# Patient Record
Sex: MALE | Race: WHITE | NOT HISPANIC OR LATINO | Employment: PART TIME | URBAN - METROPOLITAN AREA
[De-identification: names, ages, dates, MRNs, and addresses within clinical notes are randomized per-mention and may not be internally consistent; named-entity substitution may affect disease eponyms.]

---

## 2023-03-24 PROBLEM — R50.9 FEVER: Status: ACTIVE | Noted: 2023-03-24

## 2023-03-24 PROBLEM — M75.20 BICEPS TENDINITIS: Status: ACTIVE | Noted: 2023-03-24

## 2023-03-24 PROBLEM — M79.645 PAIN OF LEFT THUMB: Status: ACTIVE | Noted: 2023-03-24

## 2023-03-24 PROBLEM — E66.3 OVERWEIGHT (BMI 25.0-29.9): Status: ACTIVE | Noted: 2023-03-24

## 2023-03-24 PROBLEM — I10 HYPERTENSION: Status: ACTIVE | Noted: 2023-03-24

## 2023-03-24 PROBLEM — M62.830 BACK MUSCLE SPASM: Status: ACTIVE | Noted: 2023-03-24

## 2023-03-24 PROBLEM — M19.049 ARTHRITIS OF HAND: Status: ACTIVE | Noted: 2023-03-24

## 2023-03-24 PROBLEM — G47.00 INSOMNIA: Status: ACTIVE | Noted: 2023-03-24

## 2023-03-24 PROBLEM — U07.1 COVID-19: Status: ACTIVE | Noted: 2023-03-24

## 2023-03-24 PROBLEM — E06.1 DE QUERVAIN THYROIDITIS: Status: ACTIVE | Noted: 2023-03-24
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
## 2023-03-24 PROBLEM — D64.9 ANEMIA: Status: ACTIVE | Noted: 2023-03-24

## 2023-03-24 PROBLEM — M54.50 LOW BACK PAIN: Status: ACTIVE | Noted: 2023-03-24

## 2023-03-24 PROBLEM — L30.9 ECZEMA: Status: ACTIVE | Noted: 2023-03-24

## 2023-03-24 PROBLEM — D72.810 EPISODIC LYMPHOPENIA: Status: ACTIVE | Noted: 2023-03-24

## 2023-03-24 PROBLEM — F10.10 ALCOHOL ABUSE: Status: ACTIVE | Noted: 2023-03-24

## 2023-03-24 RX ORDER — DICLOFENAC SODIUM 10 MG/G
GEL TOPICAL
COMMUNITY
Start: 2022-02-18 | End: 2023-03-27 | Stop reason: ALTCHOICE

## 2023-03-24 RX ORDER — LISINOPRIL 10 MG/1
1 TABLET ORAL DAILY
COMMUNITY
Start: 2022-02-18 | End: 2023-05-30 | Stop reason: SDUPTHER

## 2023-03-24 RX ORDER — TRAZODONE HYDROCHLORIDE 50 MG/1
0.5 TABLET ORAL NIGHTLY
COMMUNITY
Start: 2022-08-31 | End: 2023-05-24

## 2023-03-24 RX ORDER — CYCLOBENZAPRINE HCL 5 MG
1 TABLET ORAL 3 TIMES DAILY PRN
COMMUNITY
Start: 2022-06-16 | End: 2023-03-27 | Stop reason: ALTCHOICE

## 2023-03-24 RX ORDER — LOPERAMIDE HYDROCHLORIDE 2 MG/1
2 CAPSULE ORAL
COMMUNITY
End: 2024-03-08 | Stop reason: WASHOUT

## 2023-03-24 RX ORDER — HYDROCORTISONE 25 MG/G
CREAM TOPICAL 2 TIMES DAILY
COMMUNITY
Start: 2021-08-17

## 2023-03-24 RX ORDER — AMLODIPINE BESYLATE 5 MG/1
1 TABLET ORAL DAILY
COMMUNITY
Start: 2021-08-17 | End: 2023-03-27 | Stop reason: SDUPTHER

## 2023-03-24 RX ORDER — ONDANSETRON 4 MG/1
4 TABLET, FILM COATED ORAL EVERY 8 HOURS
COMMUNITY
End: 2023-03-27 | Stop reason: ALTCHOICE

## 2023-03-24 RX ORDER — NAPROXEN 250 MG/1
1 TABLET ORAL EVERY 12 HOURS PRN
COMMUNITY
Start: 2022-05-19 | End: 2023-03-27 | Stop reason: ALTCHOICE

## 2023-03-24 RX ORDER — MELOXICAM 15 MG/1
1 TABLET ORAL DAILY PRN
COMMUNITY
Start: 2021-08-24 | End: 2023-03-27 | Stop reason: ALTCHOICE

## 2023-03-24 RX ORDER — CLOBETASOL PROPIONATE 0.5 MG/G
1 CREAM TOPICAL 2 TIMES DAILY
COMMUNITY
Start: 2022-05-16

## 2023-03-24 RX ORDER — ALBUTEROL SULFATE 90 UG/1
1-2 AEROSOL, METERED RESPIRATORY (INHALATION)
COMMUNITY
End: 2023-03-27 | Stop reason: ALTCHOICE

## 2023-03-27 ENCOUNTER — OFFICE VISIT (OUTPATIENT)
Dept: PRIMARY CARE | Facility: CLINIC | Age: 53
End: 2023-03-27
Payer: COMMERCIAL

## 2023-03-27 VITALS
BODY MASS INDEX: 29.4 KG/M2 | WEIGHT: 210 LBS | HEIGHT: 71 IN | SYSTOLIC BLOOD PRESSURE: 126 MMHG | DIASTOLIC BLOOD PRESSURE: 82 MMHG

## 2023-03-27 DIAGNOSIS — R19.7 DIARRHEA, UNSPECIFIED TYPE: ICD-10-CM

## 2023-03-27 DIAGNOSIS — I10 PRIMARY HYPERTENSION: Primary | ICD-10-CM

## 2023-03-27 PROCEDURE — 3074F SYST BP LT 130 MM HG: CPT | Performed by: STUDENT IN AN ORGANIZED HEALTH CARE EDUCATION/TRAINING PROGRAM

## 2023-03-27 PROCEDURE — 99213 OFFICE O/P EST LOW 20 MIN: CPT | Performed by: STUDENT IN AN ORGANIZED HEALTH CARE EDUCATION/TRAINING PROGRAM

## 2023-03-27 PROCEDURE — 3079F DIAST BP 80-89 MM HG: CPT | Performed by: STUDENT IN AN ORGANIZED HEALTH CARE EDUCATION/TRAINING PROGRAM

## 2023-03-27 PROCEDURE — 1036F TOBACCO NON-USER: CPT | Performed by: STUDENT IN AN ORGANIZED HEALTH CARE EDUCATION/TRAINING PROGRAM

## 2023-03-27 RX ORDER — AMLODIPINE BESYLATE 5 MG/1
5 TABLET ORAL DAILY
Qty: 90 TABLET | Refills: 1 | Status: SHIPPED | OUTPATIENT
Start: 2023-03-27 | End: 2023-09-25

## 2023-03-27 RX ORDER — LORATADINE 10 MG/1
10 TABLET ORAL
COMMUNITY
Start: 2015-01-22 | End: 2023-03-27 | Stop reason: ALTCHOICE

## 2023-03-27 RX ORDER — METHOCARBAMOL 500 MG/1
TABLET, FILM COATED ORAL
COMMUNITY
Start: 2023-01-24 | End: 2023-03-27 | Stop reason: ALTCHOICE

## 2023-03-27 RX ORDER — MELOXICAM 15 MG/1
15 TABLET ORAL
COMMUNITY
Start: 2021-08-24 | End: 2023-03-27 | Stop reason: ALTCHOICE

## 2023-03-27 RX ORDER — HYDROCORTISONE 25 MG/G
CREAM TOPICAL 2 TIMES DAILY
COMMUNITY
Start: 2021-08-17 | End: 2023-03-27 | Stop reason: ALTCHOICE

## 2023-03-27 RX ORDER — TRIAMCINOLONE ACETONIDE 1 MG/G
1 CREAM TOPICAL
COMMUNITY
Start: 2015-01-26 | End: 2023-03-27 | Stop reason: ALTCHOICE

## 2023-03-27 RX ORDER — DICLOFENAC SODIUM 75 MG/1
75 TABLET, DELAYED RELEASE ORAL 2 TIMES DAILY
COMMUNITY
Start: 2021-08-31 | End: 2023-03-27 | Stop reason: ALTCHOICE

## 2023-03-27 ASSESSMENT — ENCOUNTER SYMPTOMS
DIARRHEA: 1
MUSCULOSKELETAL NEGATIVE: 1
RESPIRATORY NEGATIVE: 1
NEUROLOGICAL NEGATIVE: 1
PSYCHIATRIC NEGATIVE: 1
CARDIOVASCULAR NEGATIVE: 1
ABDOMINAL PAIN: 1
CONSTITUTIONAL NEGATIVE: 1
NAUSEA: 1

## 2023-03-27 NOTE — LETTER
March 27, 2023     Patient: Adalberto Franklin Jr.   YOB: 1970   Date of Visit: 3/27/2023       To Whom It May Concern:    Adalberto Franklin was seen in my clinic on 3/27/2023 at 11:00 am. Please excuse Adalberto for his absence from work on 3/21/23-3/25/23. Patient is cleared to return to work on 3/28/23 with no restrictions    If you have any questions or concerns, please don't hesitate to call.         Sincerely,         Tom Rincon,         CC: No Recipients
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
## 2023-04-04 ENCOUNTER — OFFICE VISIT (OUTPATIENT)
Dept: PRIMARY CARE | Facility: CLINIC | Age: 53
End: 2023-04-04
Payer: COMMERCIAL

## 2023-04-04 ENCOUNTER — LAB (OUTPATIENT)
Dept: LAB | Facility: LAB | Age: 53
End: 2023-04-04
Payer: COMMERCIAL

## 2023-04-04 VITALS
OXYGEN SATURATION: 98 % | BODY MASS INDEX: 30.52 KG/M2 | WEIGHT: 218 LBS | HEIGHT: 71 IN | SYSTOLIC BLOOD PRESSURE: 136 MMHG | DIASTOLIC BLOOD PRESSURE: 80 MMHG | HEART RATE: 83 BPM

## 2023-04-04 DIAGNOSIS — R19.7 DIARRHEA, UNSPECIFIED TYPE: ICD-10-CM

## 2023-04-04 DIAGNOSIS — Z00.00 HEALTHCARE MAINTENANCE: Primary | ICD-10-CM

## 2023-04-04 DIAGNOSIS — K52.9 GASTROENTERITIS: ICD-10-CM

## 2023-04-04 DIAGNOSIS — Z00.00 HEALTHCARE MAINTENANCE: ICD-10-CM

## 2023-04-04 LAB
BASOPHILS (10*3/UL) IN BLOOD BY AUTOMATED COUNT: 0.03 X10E9/L (ref 0–0.1)
BASOPHILS/100 LEUKOCYTES IN BLOOD BY AUTOMATED COUNT: 0.7 % (ref 0–2)
EOSINOPHILS (10*3/UL) IN BLOOD BY AUTOMATED COUNT: 0.05 X10E9/L (ref 0–0.7)
EOSINOPHILS/100 LEUKOCYTES IN BLOOD BY AUTOMATED COUNT: 1.1 % (ref 0–6)
ERYTHROCYTE DISTRIBUTION WIDTH (RATIO) BY AUTOMATED COUNT: 15.9 % (ref 11.5–14.5)
ERYTHROCYTE MEAN CORPUSCULAR HEMOGLOBIN CONCENTRATION (G/DL) BY AUTOMATED: 31.5 G/DL (ref 32–36)
ERYTHROCYTE MEAN CORPUSCULAR VOLUME (FL) BY AUTOMATED COUNT: 90 FL (ref 80–100)
ERYTHROCYTES (10*6/UL) IN BLOOD BY AUTOMATED COUNT: 4.82 X10E12/L (ref 4.5–5.9)
HEMATOCRIT (%) IN BLOOD BY AUTOMATED COUNT: 43.5 % (ref 41–52)
HEMOGLOBIN (G/DL) IN BLOOD: 13.7 G/DL (ref 13.5–17.5)
IMMATURE GRANULOCYTES/100 LEUKOCYTES IN BLOOD BY AUTOMATED COUNT: 0.2 % (ref 0–0.9)
LEUKOCYTES (10*3/UL) IN BLOOD BY AUTOMATED COUNT: 4.4 X10E9/L (ref 4.4–11.3)
LYMPHOCYTES (10*3/UL) IN BLOOD BY AUTOMATED COUNT: 0.99 X10E9/L (ref 1.2–4.8)
LYMPHOCYTES/100 LEUKOCYTES IN BLOOD BY AUTOMATED COUNT: 22.6 % (ref 13–44)
MONOCYTES (10*3/UL) IN BLOOD BY AUTOMATED COUNT: 0.6 X10E9/L (ref 0.1–1)
MONOCYTES/100 LEUKOCYTES IN BLOOD BY AUTOMATED COUNT: 13.7 % (ref 2–10)
NEUTROPHILS (10*3/UL) IN BLOOD BY AUTOMATED COUNT: 2.7 X10E9/L (ref 1.2–7.7)
NEUTROPHILS/100 LEUKOCYTES IN BLOOD BY AUTOMATED COUNT: 61.7 % (ref 40–80)
NRBC (PER 100 WBCS) BY AUTOMATED COUNT: 0 /100 WBC (ref 0–0)
PLATELETS (10*3/UL) IN BLOOD AUTOMATED COUNT: 215 X10E9/L (ref 150–450)
PROSTATE SPECIFIC ANTIGEN,SCREEN: 0.65 NG/ML (ref 0–4)
THYROTROPIN (MIU/L) IN SER/PLAS BY DETECTION LIMIT <= 0.05 MIU/L: 1.01 MIU/L (ref 0.44–3.98)
THYROXINE (T4) FREE (NG/DL) IN SER/PLAS: 1.12 NG/DL (ref 0.78–1.48)

## 2023-04-04 PROCEDURE — 3075F SYST BP GE 130 - 139MM HG: CPT | Performed by: INTERNAL MEDICINE

## 2023-04-04 PROCEDURE — 80053 COMPREHEN METABOLIC PANEL: CPT

## 2023-04-04 PROCEDURE — 85025 COMPLETE CBC W/AUTO DIFF WBC: CPT

## 2023-04-04 PROCEDURE — 84153 ASSAY OF PSA TOTAL: CPT

## 2023-04-04 PROCEDURE — 84439 ASSAY OF FREE THYROXINE: CPT

## 2023-04-04 PROCEDURE — 99214 OFFICE O/P EST MOD 30 MIN: CPT | Performed by: INTERNAL MEDICINE

## 2023-04-04 PROCEDURE — 36415 COLL VENOUS BLD VENIPUNCTURE: CPT

## 2023-04-04 PROCEDURE — 1036F TOBACCO NON-USER: CPT | Performed by: INTERNAL MEDICINE

## 2023-04-04 PROCEDURE — 83036 HEMOGLOBIN GLYCOSYLATED A1C: CPT

## 2023-04-04 PROCEDURE — 3079F DIAST BP 80-89 MM HG: CPT | Performed by: INTERNAL MEDICINE

## 2023-04-04 PROCEDURE — 80061 LIPID PANEL: CPT

## 2023-04-04 PROCEDURE — 83690 ASSAY OF LIPASE: CPT

## 2023-04-04 PROCEDURE — 84443 ASSAY THYROID STIM HORMONE: CPT

## 2023-04-04 RX ORDER — LEVOFLOXACIN 500 MG/1
500 TABLET, FILM COATED ORAL DAILY
Qty: 7 TABLET | Refills: 2 | Status: SHIPPED | OUTPATIENT
Start: 2023-04-04 | End: 2023-04-11

## 2023-04-04 RX ORDER — METRONIDAZOLE 500 MG/1
500 TABLET ORAL 3 TIMES DAILY
Qty: 21 TABLET | Refills: 2 | Status: SHIPPED | OUTPATIENT
Start: 2023-04-04 | End: 2023-04-11

## 2023-04-04 NOTE — PROGRESS NOTES
"Subjective   Patient ID: Adalberto Franklin Jr. is a 52 y.o. male who presents for Follow-up (1 week follow up on stomach ache. Still having issues, diarrhea and pains in stomach.).  HPI        Patient presents he states since last Monday he is having diarrhea seem to get better maybe for Tuesday intermittently got worse on Sunday came back again rather severe diarrhea \" smells like death\" somewhere over 4 stools a day grade 7 out of 10 seems worse with eating anything he eats causes diarrhea nothing really makes better except not eating tried Imodium Pepto-Bismol [place] did not really help  Denies abdominal pain nausea vomiting fever chills hematochezia melena hematemesis  Denies recent travel sick contacts raw food recent antibiotics        Health Maintenance:      Colonoscopy: Refused      Mammogram:      Pelvic/Pap:      Low dose chest CT:      Aorta duplex:      Optho:      Podiatry:        Vaccines:      Prevnar 20:      Prevnar 13:      Pneumovax 23:      Tdap:      Shingrix:      COVID:      Influenza:        ROS:      General: denies fever/chills/weight loss      Head: Sinus congestion for 2 months persistent denies HA/trauma/masses/dizziness      Eyes: denies vision change/loss of vision/blurry vision/diplopia/eye pain      Ears: denies hearing loss/tinnitus/otalgia/otorrhea      Nose: Occasional yellow rhinorrhea 2 months denies nasal drainage/anosmia      Throat: denies dysphagia/odynophagia      Lymphatics: denies lymph node swelling      Cardiac: denies CP/palpitations/orthopnea/PND      Pulmonary: denies dyspnea/cough/wheezing      GI: Subacute diarrhea worsening not getting better denies abd pain/n/v/diarrhea/melena/hematochezia/hematemesis      : denies dysuria/hematuria/change frequency      Genital: denies genital discharge/lesions      Skin: denies rashes/lesions/masses      MSK: denies weakness/swelling/edema/gait imbalance/pain      Neuro: denies paresthesias/seizures/dysarthria      " "Psych: denies depression/anxiety/suicidal or homicidal ideations            Objective   /80   Pulse 83   Ht 1.803 m (5' 11\")   Wt 98.9 kg (218 lb)   SpO2 98%   BMI 30.40 kg/m²      Physical Exam:     General: AO3, NAD     Head: atraumatic/NC     Eyes: EOMI/PERRLA. Negative APD     Ears: TM pearly gray, EAC clear. No lesions or erythema     Nose: symmetric nares, no discharge     Throat: trachea midline, uvula midline pink mucosa. No thyromegaly     Lymphatics: no cervical/supraclavicular/ant or posterior cervical adenopathy/axillary/inguinal adenopathy     Breast: not examined     Chest: no deformity or tenderness to palpation     Pulm: CTA b/l, no wheeze/rhonchi/rales. nonlabored     Cardiac: RRR +s1s2, no m/r/g.      GI: soft, NT/ND. Normoactive Bsx4. No rebound/guarding.  Negative Melendez negative Rovsing negative McBurney     Rectal: no examined     MSK: 5/5 strength UE LE. No edema/clubbing/cyanosis     Skin: no rashes/lesions     Vascular: 2+ palp DP PT radials b/l. Negative carotid bruit     Neuro: CNII-XII intact. No focal deficits. Reflexes 2/4 brachioradialis bicep tricep patellar achilles. Finger to nose intact.     Psych: appropriate mood/affect                    No results found for: BMPR1A, CBCDIF      Assessment/Plan   Diagnoses and all orders for this visit:  Healthcare maintenance  -     Lipid panel; Future  -     Hemoglobin A1C; Future  -     T4, free; Future  -     TSH; Future  -     Prostate Spec.Ag,Screen; Future  Gastroenteritis  Comments:  Probable bacterial less likely persistent viral such as coronavirus versus other norovirus versus C. difficile  Orders:  -     Sars-CoV-2 PCR, Symptomatic; Future  -     Stool Pathogen Panel, PCR; Future  -     C. difficile, PCR; Future  -     levoFLOXacin (Levaquin) 500 mg tablet; Take 1 tablet (500 mg) by mouth once daily for 7 days.  -     metroNIDAZOLE (Flagyl) 500 mg tablet; Take 1 tablet (500 mg) by mouth in the morning and 1 tablet (500 mg) " in the evening and 1 tablet (500 mg) before bedtime. Do all this for 7 days.  -     Lipase; Future    Go to the ER for any new or worsening abdominal pain or diarrhea  Work note written for patient to go back to work April 7, 2023    Thank you for making from today Adalberto    Please follow-up in 3 months       Sierra Nguyen MA

## 2023-04-04 NOTE — LETTER
April 4, 2023     Patient: Adalberto Franklin .   YOB: 1970   Date of Visit: 4/4/2023       To Whom It May Concern:    Adalberto Franklin was seen in my clinic on 4/4/2023 at 2:45 pm. Please excuse Adalberto for his absence from work on this day to make the appointment, through 4/6/23, return to work 4/7/23.    If you have any questions or concerns, please don't hesitate to call.         Sincerely,         Michael Marc,         CC: No Recipients

## 2023-04-05 DIAGNOSIS — E78.5 HYPERLIPIDEMIA, UNSPECIFIED HYPERLIPIDEMIA TYPE: Primary | ICD-10-CM

## 2023-04-05 LAB
ALANINE AMINOTRANSFERASE (SGPT) (U/L) IN SER/PLAS: 32 U/L (ref 10–52)
ALBUMIN (G/DL) IN SER/PLAS: 4.7 G/DL (ref 3.4–5)
ALKALINE PHOSPHATASE (U/L) IN SER/PLAS: 48 U/L (ref 33–120)
ANION GAP IN SER/PLAS: 20 MMOL/L (ref 10–20)
ASPARTATE AMINOTRANSFERASE (SGOT) (U/L) IN SER/PLAS: 37 U/L (ref 9–39)
BILIRUBIN TOTAL (MG/DL) IN SER/PLAS: 0.7 MG/DL (ref 0–1.2)
CALCIUM (MG/DL) IN SER/PLAS: 9.7 MG/DL (ref 8.6–10.6)
CARBON DIOXIDE, TOTAL (MMOL/L) IN SER/PLAS: 23 MMOL/L (ref 21–32)
CHLORIDE (MMOL/L) IN SER/PLAS: 103 MMOL/L (ref 98–107)
CHOLESTEROL (MG/DL) IN SER/PLAS: 223 MG/DL (ref 0–199)
CHOLESTEROL IN HDL (MG/DL) IN SER/PLAS: 89.1 MG/DL
CHOLESTEROL/HDL RATIO: 2.5
CREATININE (MG/DL) IN SER/PLAS: 0.87 MG/DL (ref 0.5–1.3)
ESTIMATED AVERAGE GLUCOSE FOR HBA1C: 126 MG/DL
GFR MALE: >90 ML/MIN/1.73M2
GLUCOSE (MG/DL) IN SER/PLAS: 104 MG/DL (ref 74–99)
HEMOGLOBIN A1C/HEMOGLOBIN TOTAL IN BLOOD: 6 %
LDL: 124 MG/DL (ref 0–99)
LIPASE (U/L) IN SER/PLAS: 41 U/L (ref 9–82)
POTASSIUM (MMOL/L) IN SER/PLAS: 4.5 MMOL/L (ref 3.5–5.3)
PROTEIN TOTAL: 7.9 G/DL (ref 6.4–8.2)
SODIUM (MMOL/L) IN SER/PLAS: 141 MMOL/L (ref 136–145)
TRIGLYCERIDE (MG/DL) IN SER/PLAS: 49 MG/DL (ref 0–149)
UREA NITROGEN (MG/DL) IN SER/PLAS: 6 MG/DL (ref 6–23)
VLDL: 10 MG/DL (ref 0–40)

## 2023-06-30 ENCOUNTER — OFFICE VISIT (OUTPATIENT)
Dept: PRIMARY CARE | Facility: CLINIC | Age: 53
End: 2023-06-30
Payer: COMMERCIAL

## 2023-06-30 VITALS — SYSTOLIC BLOOD PRESSURE: 120 MMHG | DIASTOLIC BLOOD PRESSURE: 76 MMHG

## 2023-06-30 DIAGNOSIS — M19.049 ARTHRITIS OF HAND: Primary | ICD-10-CM

## 2023-06-30 DIAGNOSIS — I10 PRIMARY HYPERTENSION: ICD-10-CM

## 2023-06-30 PROCEDURE — 3078F DIAST BP <80 MM HG: CPT | Performed by: STUDENT IN AN ORGANIZED HEALTH CARE EDUCATION/TRAINING PROGRAM

## 2023-06-30 PROCEDURE — 99213 OFFICE O/P EST LOW 20 MIN: CPT | Performed by: STUDENT IN AN ORGANIZED HEALTH CARE EDUCATION/TRAINING PROGRAM

## 2023-06-30 PROCEDURE — 3074F SYST BP LT 130 MM HG: CPT | Performed by: STUDENT IN AN ORGANIZED HEALTH CARE EDUCATION/TRAINING PROGRAM

## 2023-06-30 PROCEDURE — 1036F TOBACCO NON-USER: CPT | Performed by: STUDENT IN AN ORGANIZED HEALTH CARE EDUCATION/TRAINING PROGRAM

## 2023-06-30 RX ORDER — DICLOFENAC SODIUM 10 MG/G
4 GEL TOPICAL 3 TIMES DAILY PRN
Qty: 450 G | Refills: 1 | Status: SHIPPED | OUTPATIENT
Start: 2023-06-30 | End: 2023-09-28

## 2023-06-30 ASSESSMENT — ENCOUNTER SYMPTOMS
NEUROLOGICAL NEGATIVE: 1
CONSTITUTIONAL NEGATIVE: 1
MUSCULOSKELETAL NEGATIVE: 1
RESPIRATORY NEGATIVE: 1
PSYCHIATRIC NEGATIVE: 1
CARDIOVASCULAR NEGATIVE: 1
GASTROINTESTINAL NEGATIVE: 1
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
## 2023-07-03 ENCOUNTER — APPOINTMENT (OUTPATIENT)
Dept: PRIMARY CARE | Facility: CLINIC | Age: 53
End: 2023-07-03
Payer: COMMERCIAL

## 2023-07-10 DIAGNOSIS — E78.5 HYPERLIPIDEMIA, UNSPECIFIED HYPERLIPIDEMIA TYPE: ICD-10-CM

## 2023-08-28 DIAGNOSIS — I10 PRIMARY HYPERTENSION: ICD-10-CM

## 2023-08-28 RX ORDER — LISINOPRIL 10 MG/1
10 TABLET ORAL DAILY
Qty: 90 TABLET | Refills: 1 | Status: SHIPPED | OUTPATIENT
Start: 2023-08-28 | End: 2023-11-02 | Stop reason: SDUPTHER

## 2023-09-23 DIAGNOSIS — I10 PRIMARY HYPERTENSION: ICD-10-CM

## 2023-09-25 RX ORDER — AMLODIPINE BESYLATE 5 MG/1
5 TABLET ORAL DAILY
Qty: 90 TABLET | Refills: 0 | Status: SHIPPED | OUTPATIENT
Start: 2023-09-25 | End: 2024-01-05 | Stop reason: SDUPTHER

## 2023-11-02 ENCOUNTER — OFFICE VISIT (OUTPATIENT)
Dept: PRIMARY CARE | Facility: CLINIC | Age: 53
End: 2023-11-02
Payer: COMMERCIAL

## 2023-11-02 VITALS — SYSTOLIC BLOOD PRESSURE: 168 MMHG | WEIGHT: 220 LBS | DIASTOLIC BLOOD PRESSURE: 80 MMHG | BODY MASS INDEX: 30.68 KG/M2

## 2023-11-02 DIAGNOSIS — I10 PRIMARY HYPERTENSION: ICD-10-CM

## 2023-11-02 DIAGNOSIS — M19.049 HAND ARTHRITIS: ICD-10-CM

## 2023-11-02 DIAGNOSIS — G44.209 TENSION HEADACHE: Primary | ICD-10-CM

## 2023-11-02 PROBLEM — R21 RASH AND OTHER NONSPECIFIC SKIN ERUPTION: Status: ACTIVE | Noted: 2022-08-02

## 2023-11-02 PROCEDURE — 99214 OFFICE O/P EST MOD 30 MIN: CPT | Performed by: STUDENT IN AN ORGANIZED HEALTH CARE EDUCATION/TRAINING PROGRAM

## 2023-11-02 PROCEDURE — 3079F DIAST BP 80-89 MM HG: CPT | Performed by: STUDENT IN AN ORGANIZED HEALTH CARE EDUCATION/TRAINING PROGRAM

## 2023-11-02 PROCEDURE — 1036F TOBACCO NON-USER: CPT | Performed by: STUDENT IN AN ORGANIZED HEALTH CARE EDUCATION/TRAINING PROGRAM

## 2023-11-02 PROCEDURE — 3077F SYST BP >= 140 MM HG: CPT | Performed by: STUDENT IN AN ORGANIZED HEALTH CARE EDUCATION/TRAINING PROGRAM

## 2023-11-02 RX ORDER — TRIAMCINOLONE ACETONIDE 1 MG/G
1 CREAM TOPICAL
COMMUNITY
Start: 2015-01-26

## 2023-11-02 RX ORDER — LISINOPRIL 20 MG/1
20 TABLET ORAL DAILY
Qty: 90 TABLET | Refills: 1 | Status: SHIPPED | OUTPATIENT
Start: 2023-11-02 | End: 2024-05-13 | Stop reason: SDUPTHER

## 2023-11-02 RX ORDER — SUMATRIPTAN SUCCINATE 100 MG/1
100 TABLET ORAL ONCE AS NEEDED
Qty: 27 TABLET | Refills: 1 | Status: SHIPPED | OUTPATIENT
Start: 2023-11-02 | End: 2024-03-08 | Stop reason: WASHOUT

## 2023-11-02 ASSESSMENT — ENCOUNTER SYMPTOMS
CONSTITUTIONAL NEGATIVE: 1
GASTROINTESTINAL NEGATIVE: 1
CARDIOVASCULAR NEGATIVE: 1
WEAKNESS: 1
MUSCULOSKELETAL NEGATIVE: 1
PSYCHIATRIC NEGATIVE: 1
HEADACHES: 1
RESPIRATORY NEGATIVE: 1

## 2023-11-02 NOTE — LETTER
November 2, 2023     Patient: Adalberto Franklin Jr.   YOB: 1970   Date of Visit: 11/2/2023       To Whom It May Concern:    Adalberto Franklin was seen in my clinic on 11/2/2023 at 2:30 pm. Please excuse Adalberto for his absence from work on this day as well as 11/3/23 and 11/4/23 due to medical illness.    If you have any questions or concerns, please don't hesitate to call.         Sincerely,         Tom Rincon,         CC: No Recipients

## 2023-11-02 NOTE — PROGRESS NOTES
Headaches for 3x days    Subjective   Patient ID: Adalberto Franklin Jr. is a 53 y.o. male.    Patient seen on follow-up and sick visit.  Regards that he is having worsening headaches over the past week or so.  States that these have come on by himself, He typically does not have migraines however these have been excruciating.  Has been trying to stay well-hydrated avoid excess triggering factors without much improvement.  Did take a medication from his girlfriend with some improvement as well  and this was a triptan medication.  However the migraines came back.  Regards that he is having hand pain as well, did have previous x-rays and would like to review those.  Otherwise, states no additional issues or concerns.    Headache   Associated symptoms include weakness.       Review of Systems   Constitutional: Negative.    HENT: Negative.     Respiratory: Negative.     Cardiovascular: Negative.    Gastrointestinal: Negative.    Musculoskeletal: Negative.    Skin: Negative.    Neurological:  Positive for weakness and headaches.   Psychiatric/Behavioral: Negative.         Objective   Visit Vitals  /80   Wt 99.8 kg (220 lb)   BMI 30.68 kg/m²   Smoking Status Never   BSA 2.24 m²    Physical Exam  Constitutional:       General: He is not in acute distress.     Appearance: He is not ill-appearing.   Eyes:      Pupils: Pupils are equal, round, and reactive to light.   Cardiovascular:      Rate and Rhythm: Normal rate and regular rhythm.      Pulses: Normal pulses.      Heart sounds: No murmur heard.  Pulmonary:      Effort: No respiratory distress.      Breath sounds: No wheezing.   Abdominal:      General: Abdomen is flat. Bowel sounds are normal. There is no distension.   Musculoskeletal:      Right lower leg: No edema.      Left lower leg: No edema.      Comments: Bilateral arthralgias CMC arthritis   Skin:     General: Skin is warm and dry.   Neurological:      Mental Status: He is alert. Mental status is at baseline.       Cranial Nerves: No cranial nerve deficit.      Motor: No weakness.   Psychiatric:         Mood and Affect: Mood normal.         Behavior: Behavior normal.         Assessment/Plan   Diagnoses and all orders for this visit:  Tension headache  -     rimegepant (NURTEC) 75 mg tablet,disintegrating; Take 1 tablet (75 mg) by mouth once daily as needed (headache).  -     SUMAtriptan (Imitrex) 100 mg tablet; Take 1 tablet (100 mg) by mouth 1 time if needed for migraine. May repeat after 2 hours.  Primary hypertension  -     lisinopril 20 mg tablet; Take 1 tablet (20 mg) by mouth once daily.  Hand arthritis  -     Referral to Orthopaedic Surgery; Future  Patient seen on sick evaluation    #Tension headaches  Signs and symptoms consistent with tension migraines, recommend Nurtec as needed as he is getting these very infrequently if not covered by insurance, recommend sumatriptan, avoidance of exacerbating factors, staying well-hydrated.  Continue supportive care, if persistent, recommend MRI and preventative medication    #Hypertension  Significantly elevated today could be a component of symptoms, increase lisinopril to 20 mg    #Bilateral hand arthritis  X-rays reviewed, discussed bracing, anti-inflammatories, referral to orthopedics    Return to care in 3 to 4 months or as needed

## 2023-11-03 ENCOUNTER — TELEPHONE (OUTPATIENT)
Dept: PRIMARY CARE | Facility: CLINIC | Age: 53
End: 2023-11-03
Payer: COMMERCIAL

## 2023-12-04 ENCOUNTER — OFFICE VISIT (OUTPATIENT)
Dept: ORTHOPEDIC SURGERY | Facility: CLINIC | Age: 53
End: 2023-12-04
Payer: COMMERCIAL

## 2023-12-04 VITALS — HEIGHT: 71 IN | BODY MASS INDEX: 30.1 KG/M2 | WEIGHT: 215 LBS

## 2023-12-04 DIAGNOSIS — M79.644 BILATERAL THUMB PAIN: Primary | ICD-10-CM

## 2023-12-04 DIAGNOSIS — M19.049 HAND ARTHRITIS: ICD-10-CM

## 2023-12-04 DIAGNOSIS — M79.645 BILATERAL THUMB PAIN: Primary | ICD-10-CM

## 2023-12-04 DIAGNOSIS — M18.0 PRIMARY OSTEOARTHRITIS OF BOTH FIRST CARPOMETACARPAL JOINTS: ICD-10-CM

## 2023-12-04 PROCEDURE — 1036F TOBACCO NON-USER: CPT | Performed by: ORTHOPAEDIC SURGERY

## 2023-12-04 PROCEDURE — 20600 DRAIN/INJ JOINT/BURSA W/O US: CPT | Performed by: ORTHOPAEDIC SURGERY

## 2023-12-04 PROCEDURE — 99213 OFFICE O/P EST LOW 20 MIN: CPT | Performed by: ORTHOPAEDIC SURGERY

## 2023-12-04 RX ORDER — LIDOCAINE HYDROCHLORIDE 20 MG/ML
0.5 INJECTION, SOLUTION INFILTRATION; PERINEURAL
Status: COMPLETED | OUTPATIENT
Start: 2023-12-04 | End: 2023-12-04

## 2023-12-04 RX ORDER — TRIAMCINOLONE ACETONIDE 40 MG/ML
5 INJECTION, SUSPENSION INTRA-ARTICULAR; INTRAMUSCULAR
Status: COMPLETED | OUTPATIENT
Start: 2023-12-04 | End: 2023-12-04

## 2023-12-04 RX ADMIN — LIDOCAINE HYDROCHLORIDE 0.5 ML: 20 INJECTION, SOLUTION INFILTRATION; PERINEURAL at 12:00

## 2023-12-04 RX ADMIN — TRIAMCINOLONE ACETONIDE 5 MG: 40 INJECTION, SUSPENSION INTRA-ARTICULAR; INTRAMUSCULAR at 12:00

## 2023-12-04 NOTE — PROGRESS NOTES
Subjective    Patient ID: Adalberto Diggs Jr. is a 53 y.o. male.    Chief Complaint: Pain of the Right Wrist and Pain of the Left Wrist (LEFT THUMB )     Last Surgery: No surgery found  Last Surgery Date: No surgery found    Right Wrist     Left Wrist       Patient is a 53-year-old right-hand-dominant gentleman who comes in with a complaint of bilateral thumb pain.  This has been present for at least 2 years.  He has tried anti-inflammatories with very little relief.  Symptoms are equally bad in both wrists.  He has difficulty with lifting objects and performing simple activities of daily living.    Objective   Ortho Exam  Patient is in no acute distress.specific exam of his left hand and wrist reveals his skin envelope is intact.  He is tender to palpation at the left or CMC joint.  He has a positive first CMC grind.  He has a negative Finkelstein test.  there is no evidence of a trigger thumb.    Exam of the patient's right hand and wrist reveals his skin envelope is intact.  There is no evidence of atrophy.  He is tender at the first CMC joint.  He has a positive for CMC grind.  He has a negative Finkelstein test.    Image Results:  XR wrist right 3+ views  Narrative: Interpreted By:  ALEKSANDR WALDROP MD  MRN: [id number]  Patient Name: ADALBERTO DIGGS     STUDY:  BILATERAL WRIST COMPLT; MIN 3 VIEWS;  7/11/2023 12:20 pm     INDICATION:  wrist pain bilaterally.     COMPARISON:  None.     ACCESSION NUMBER(S):  [id number]     ORDERING CLINICIAN:  IRINA CRAIG     FINDINGS:  Bilateral wrists, three views of each     There is severe joint space narrowing osteophytosis and sclerosis in  the 1st CMC and triscaphe joints on the left with moderate changes on  the right to include the midcarpal and triscaphe joints.. No erosions  or chondrocalcinosis seen. No soft tissue abnormality     Impression: Severe degenerative change on the left at the base of the thumb.  Moderate osteoarthritis in the midcarpal and triscaphe  joints on the  right. No erosions or chondrocalcinosis      Assessment/Plan   Encounter Diagnoses:  Bilateral thumb pain    Hand arthritis    Primary osteoarthritis of both first carpometacarpal joints    Patient has bilateral thumb pain secondary to first CMC arthritis.  We discussed treatment options and he elected to undergo bilateral first CMC Kenalog injections today.    S Inj/Asp: bilateral thumb CMC on 12/4/2023 12:00 PM  Indications: joint swelling  Details: 25 G needle, radial approach  Medications (Right): 5 mg triamcinolone acetonide 40 mg/mL; 0.5 mL lidocaine 20 mg/mL (2 %)  Medications (Left): 5 mg triamcinolone acetonide 40 mg/mL; 0.5 mL lidocaine 20 mg/mL (2 %)  Outcome: tolerated well, no immediate complications  Procedure, treatment alternatives, risks and benefits explained, specific risks discussed. Consent was given by the patient. Immediately prior to procedure a time out was called to verify the correct patient, procedure, equipment, support staff and site/side marked as required. Patient was prepped and draped in the usual sterile fashion.       Patient tolerated the injections well without any complications.  He was informed may take about a week to have an effect.  He may use both hands is much as his follow-up.  He will follow-up as his symptoms dictate.

## 2023-12-29 ENCOUNTER — APPOINTMENT (OUTPATIENT)
Dept: PRIMARY CARE | Facility: CLINIC | Age: 53
End: 2023-12-29
Payer: COMMERCIAL

## 2024-01-05 DIAGNOSIS — I10 PRIMARY HYPERTENSION: ICD-10-CM

## 2024-01-05 RX ORDER — AMLODIPINE BESYLATE 5 MG/1
5 TABLET ORAL DAILY
Qty: 90 TABLET | Refills: 0 | Status: SHIPPED | OUTPATIENT
Start: 2024-01-05 | End: 2024-04-01 | Stop reason: SDUPTHER

## 2024-01-12 ENCOUNTER — OFFICE VISIT (OUTPATIENT)
Dept: PRIMARY CARE | Facility: CLINIC | Age: 54
End: 2024-01-12
Payer: COMMERCIAL

## 2024-01-12 VITALS
WEIGHT: 218 LBS | BODY MASS INDEX: 30.52 KG/M2 | DIASTOLIC BLOOD PRESSURE: 84 MMHG | HEIGHT: 71 IN | SYSTOLIC BLOOD PRESSURE: 138 MMHG

## 2024-01-12 DIAGNOSIS — G47.00 INSOMNIA, UNSPECIFIED TYPE: ICD-10-CM

## 2024-01-12 DIAGNOSIS — M19.049 ARTHRITIS OF HAND: Primary | ICD-10-CM

## 2024-01-12 PROCEDURE — 3079F DIAST BP 80-89 MM HG: CPT | Performed by: STUDENT IN AN ORGANIZED HEALTH CARE EDUCATION/TRAINING PROGRAM

## 2024-01-12 PROCEDURE — 99214 OFFICE O/P EST MOD 30 MIN: CPT | Performed by: STUDENT IN AN ORGANIZED HEALTH CARE EDUCATION/TRAINING PROGRAM

## 2024-01-12 PROCEDURE — 1036F TOBACCO NON-USER: CPT | Performed by: STUDENT IN AN ORGANIZED HEALTH CARE EDUCATION/TRAINING PROGRAM

## 2024-01-12 PROCEDURE — 3075F SYST BP GE 130 - 139MM HG: CPT | Performed by: STUDENT IN AN ORGANIZED HEALTH CARE EDUCATION/TRAINING PROGRAM

## 2024-01-12 RX ORDER — PREDNISONE 20 MG/1
20 TABLET ORAL DAILY
Qty: 5 TABLET | Refills: 0 | Status: SHIPPED | OUTPATIENT
Start: 2024-01-12 | End: 2024-01-17

## 2024-01-12 RX ORDER — TRAZODONE HYDROCHLORIDE 50 MG/1
25 TABLET ORAL NIGHTLY
Qty: 45 TABLET | Refills: 0 | Status: SHIPPED | OUTPATIENT
Start: 2024-01-12 | End: 2024-01-12 | Stop reason: SDUPTHER

## 2024-01-12 RX ORDER — CAPSAICIN 0.75 MG/G
CREAM TOPICAL 3 TIMES DAILY
Qty: 56 G | Refills: 0 | Status: SHIPPED | OUTPATIENT
Start: 2024-01-12 | End: 2025-01-11

## 2024-01-12 RX ORDER — TRAZODONE HYDROCHLORIDE 100 MG/1
100 TABLET ORAL NIGHTLY
Qty: 90 TABLET | Refills: 1 | Status: SHIPPED | OUTPATIENT
Start: 2024-01-12 | End: 2024-07-10

## 2024-01-12 RX ORDER — MELOXICAM 15 MG/1
15 TABLET ORAL DAILY
Qty: 90 TABLET | Refills: 1 | Status: SHIPPED | OUTPATIENT
Start: 2024-01-12 | End: 2024-07-10

## 2024-01-12 ASSESSMENT — ENCOUNTER SYMPTOMS
GASTROINTESTINAL NEGATIVE: 1
ARTHRALGIAS: 1
PSYCHIATRIC NEGATIVE: 1
RESPIRATORY NEGATIVE: 1
MYALGIAS: 1
JOINT SWELLING: 1
CONSTITUTIONAL NEGATIVE: 1
NEUROLOGICAL NEGATIVE: 1
CARDIOVASCULAR NEGATIVE: 1

## 2024-01-12 NOTE — PROGRESS NOTES
"Subjective   Patient ID: Adalberto Frnaklin Jr. is a 53 y.o. male.    Patient seen on follow-up.  States that he is overall feeling well however again significant worsening arthritis in his left hand.  He did receive an injection in his right hand, however states that he got very minimal improvement from this.  With regards to his left hand, he states that he probably got maybe a day of relief.  He does work at [employer] so uses his hands a lot, states that he is unable to work secondary to this.  Has been trying to take some time off which has improved however finds it difficulty with some activities of daily living due to not being able to use his hands.  Otherwise, states no additional concerns.      Arthritis  He complains of joint swelling.       Review of Systems   Constitutional: Negative.    HENT: Negative.     Respiratory: Negative.     Cardiovascular: Negative.    Gastrointestinal: Negative.    Musculoskeletal:  Positive for arthralgias, arthritis, joint swelling and myalgias.   Skin: Negative.    Neurological: Negative.    Psychiatric/Behavioral: Negative.         Objective   Visit Vitals  /84   Ht 1.803 m (5' 11\")   Wt 98.9 kg (218 lb)   BMI 30.40 kg/m²   Smoking Status Never   BSA 2.23 m²      Physical Exam  Constitutional:       General: He is not in acute distress.     Appearance: He is not ill-appearing.   Eyes:      Pupils: Pupils are equal, round, and reactive to light.   Cardiovascular:      Rate and Rhythm: Normal rate and regular rhythm.      Pulses: Normal pulses.      Heart sounds: No murmur heard.  Pulmonary:      Effort: No respiratory distress.      Breath sounds: No wheezing.   Abdominal:      General: Abdomen is flat. Bowel sounds are normal. There is no distension.   Musculoskeletal:         General: Tenderness present.      Right lower leg: No edema.      Left lower leg: No edema.      Comments: Significant CMC arthritis, grind test positive   Skin:     General: Skin is warm and dry. "   Neurological:      Mental Status: He is alert. Mental status is at baseline.      Cranial Nerves: No cranial nerve deficit.      Motor: No weakness.   Psychiatric:         Mood and Affect: Mood normal.         Behavior: Behavior normal.         Assessment/Plan   Diagnoses and all orders for this visit:  Arthritis of hand  -     meloxicam (Mobic) 15 mg tablet; Take 1 tablet (15 mg) by mouth once daily.  -     predniSONE (Deltasone) 20 mg tablet; Take 1 tablet (20 mg) by mouth once daily for 5 days.  -     Referral to Orthopaedic Surgery; Future  -     capsicum (Zostrix) 0.075 % topical cream; Apply topically 3 times a day.  Insomnia, unspecified type  -     traZODone (Desyrel) 100 mg tablet; Take 1 tablet (100 mg) by mouth once daily at bedtime.  Patient seen on routine follow-up as well as sick visit    #CMC arthritis  Bilateral hands but left significantly worse at night, did receive bilateral thumb injections, did have significant improvement on the right side, however his symptoms on the left have been severe  This was exacerbated by his work schedule  Has essentially exhausted medical management at this time, recommend follow-up with orthopedics for reevaluation of surgery  Recommend meloxicam daily, bracing as well as avoiding exacerbating activities, he did have to take time off work    #Insomnia  Continues to have issues with insomnia, trazodone sometimes works get some side effects from this medication recommend increasing to 100 mg    Return to care in 4 to 6 months or as needed

## 2024-01-12 NOTE — LETTER
January 12, 2024     Patient: Adalberto Franklin Jr.   YOB: 1970   Date of Visit: 1/12/2024       To Whom It May Concern:    Adalberto Franklin was seen in my clinic on 1/12/2024 at 1:45 pm. Please excuse Adalberto for his absence from work on 12/27-12/31 due to flare up of medical illness from underlying arthritis that is debilitating and causes symptoms that make the patient unable to work.    If you have any questions or concerns, please don't hesitate to call.         Sincerely,         Tom Rincon, DO        CC: No Recipients

## 2024-01-29 ENCOUNTER — APPOINTMENT (OUTPATIENT)
Dept: PRIMARY CARE | Facility: CLINIC | Age: 54
End: 2024-01-29
Payer: COMMERCIAL

## 2024-01-30 ENCOUNTER — OFFICE VISIT (OUTPATIENT)
Dept: ORTHOPEDIC SURGERY | Facility: CLINIC | Age: 54
End: 2024-01-30
Payer: COMMERCIAL

## 2024-01-30 VITALS — WEIGHT: 218 LBS | BODY MASS INDEX: 30.52 KG/M2 | HEIGHT: 71 IN

## 2024-01-30 DIAGNOSIS — M19.049 CMC ARTHRITIS: Primary | ICD-10-CM

## 2024-01-30 DIAGNOSIS — M19.049 ARTHRITIS OF HAND: ICD-10-CM

## 2024-01-30 PROCEDURE — 99203 OFFICE O/P NEW LOW 30 MIN: CPT | Performed by: ORTHOPAEDIC SURGERY

## 2024-01-30 PROCEDURE — 1036F TOBACCO NON-USER: CPT | Performed by: ORTHOPAEDIC SURGERY

## 2024-01-30 PROCEDURE — 99213 OFFICE O/P EST LOW 20 MIN: CPT | Performed by: ORTHOPAEDIC SURGERY

## 2024-01-30 PROCEDURE — 20600 DRAIN/INJ JOINT/BURSA W/O US: CPT | Performed by: ORTHOPAEDIC SURGERY

## 2024-01-30 PROCEDURE — 2500000004 HC RX 250 GENERAL PHARMACY W/ HCPCS (ALT 636 FOR OP/ED): Performed by: ORTHOPAEDIC SURGERY

## 2024-01-30 PROCEDURE — 2500000005 HC RX 250 GENERAL PHARMACY W/O HCPCS: Performed by: ORTHOPAEDIC SURGERY

## 2024-01-30 RX ORDER — LIDOCAINE HYDROCHLORIDE 10 MG/ML
0.5 INJECTION INFILTRATION; PERINEURAL
Status: COMPLETED | OUTPATIENT
Start: 2024-01-30 | End: 2024-01-30

## 2024-01-30 RX ORDER — TRIAMCINOLONE ACETONIDE 40 MG/ML
20 INJECTION, SUSPENSION INTRA-ARTICULAR; INTRAMUSCULAR
Status: COMPLETED | OUTPATIENT
Start: 2024-01-30 | End: 2024-01-30

## 2024-01-30 RX ADMIN — TRIAMCINOLONE ACETONIDE 20 MG: 40 INJECTION, SUSPENSION INTRA-ARTICULAR; INTRAMUSCULAR at 20:45

## 2024-01-30 RX ADMIN — LIDOCAINE HYDROCHLORIDE 0.5 ML: 10 INJECTION, SOLUTION INFILTRATION; PERINEURAL at 20:45

## 2024-01-30 ASSESSMENT — PAIN SCALES - GENERAL: PAINLEVEL_OUTOF10: 6

## 2024-01-30 ASSESSMENT — PAIN DESCRIPTION - DESCRIPTORS: DESCRIPTORS: ACHING

## 2024-01-30 ASSESSMENT — PAIN - FUNCTIONAL ASSESSMENT: PAIN_FUNCTIONAL_ASSESSMENT: 0-10

## 2024-01-30 NOTE — LETTER
January 30, 2024     Patient: Adalberto Franklin Jr.   YOB: 1970   Date of Visit: 1/30/2024       To Whom It May Concern:    Adalberto Franklin was seen today for initial visit for left hand pain that prevented him from working on 1/27/24 and 1/30/24.  He will return to work on 1/31/24.    If you have any questions or concerns, please don't hesitate to call.         Sincerely,        Mingo Hanna MD    CC: No Recipients

## 2024-01-30 NOTE — PROGRESS NOTES
Patient ID: Adalberto Franklin Jr. is a 53 y.o. right hand dominant male patient who presents today for Pain of the Left Hand (The left hand is the worst) and Pain of the Right Hand    The patient presents in office today with a referral from his primary health care provider due to severe pain from base of thumb. The patient suffers from bilateral arthritis and has received cortisone injections bilaterally in 12/4/2023 from Dr. Rolle. The injections significantly improved symptoms on the right hand but did not feel any improvement on the left. He has been experiencing this pain for over 2 years and has tried bracing, anti-inflammatory medications, topical cream, arthritis gloves, and ice which provided little to no relief. He was unable to work on Saturday and today due to pain.         Please refer to New Patient Intake Form scanned into patient's electronic record for self-reported past medical history, past surgical history, medications, allergies, family history, social history and 10 point review of systems.        Examination:     Constitutional: Oriented to person, place, and time.     Appears well-developed and well-nourished.     Head: Normocephalic and atraumatic.     Eyes: Pupils are equal, round, and reactive to light.     Cardiovascular: Intact distal pulses.     Pulmonary/Chest/Breast: Effort normal. No respiratory distress.     Neurological: Alert and oriented to person, place, and time.     Skin: Skin is warm and dry.     Psychiatric: normal mood and affect. Behavior is normal.     Musculoskeletal: Left hand examination reveals soft tissue swelling and bony prominence at the base of the thumb.  Markedly positive thumb CMC grind test with crepitus..  CMC joint instability demonstrated.  No MP joint hyperextension stability.  No thenar atrophy.  Sensation subjectively normal.         X-rays of the bilateral wrist taken 7/11/2023 demonstrate pantrapezial arthritis bilaterally, worse on the left than  right.                 Impression: Left Thumb Arthritis         Plan: The patient was given an injection upon request but was told if injection fails then reconstruction surgery to the thumb will be considered next.  Also advised that we would not continue to do injections in this frequency given the fact that he had injections attempted 6 weeks ago by another provider.    Procedure Note: Risks and benefits of steroid injection discussed with patient. Risks include but are not limited to: pain, infection, allergic reaction to injected medications, changes in the color or appearance of the skin near the location site and along lymphatic drainage pathway, lack of response, cartilage damage, ligament / tendon rupture, and glycemic control issues in diabetic patients. Patient expresses understanding of risks and elects to proceed. Verbal consent was provided and a time out procedure was performed to confirm the appropriate injection location. Using aseptic technique 20 mg triamcinolone and 0.5 cc 1% lidocaine were injected into the left thumb CMC joint. The patient tolerated the injection well. Post injection instructions were provided.     S Inj/Asp: L thumb CMC on 1/30/2024 8:45 PM  Indications: pain  Details: 25 G needle, dorsal approach  Medications: 20 mg triamcinolone acetonide 40 mg/mL; 0.5 mL lidocaine 10 mg/mL (1 %)  Outcome: tolerated well, no immediate complications  Procedure, treatment alternatives, risks and benefits explained, specific risks discussed. Consent was given by the patient. Immediately prior to procedure a time out was called to verify the correct patient, procedure, equipment, support staff and site/side marked as required. Patient was prepped and draped in the usual sterile fashion.             Follow-up as needed         Mingo Hanna MD          [facility]     Department of Orthopaedic Surgery     Chief of Hand and Upper  Extremity Surgery     [facility]     Scribe Attestation  By signing my name below, I, Amol Phan Scribjennifer   attest that this documentation has been prepared under the direction and in the presence of Mingo Hanna MD.

## 2024-01-30 NOTE — LETTER
January 30, 2024     Tom Rincon DO  [address]  [address]  [facility] [address]    Patient: Adalberto Franklin Jr.   YOB: 1970   Date of Visit: 1/30/2024       Dear Dr. Tom Rincon DO:    Thank you for referring Adalberto Franklin to me for evaluation. Below are my notes for this consultation.  If you have questions, please do not hesitate to call me. I look forward to following your patient along with you.       Sincerely,     Mingo Hanna MD      CC: No Recipients  ______________________________________________________________________________________    Patient ID: Adalberto Franklin Jr. is a 53 y.o. right hand dominant male patient who presents today for Pain of the Left Hand (The left hand is the worst) and Pain of the Right Hand    The patient presents in office today with a referral from his primary health care provider due to severe pain from base of thumb. The patient suffers from bilateral arthritis and has received cortisone injections bilaterally in 12/4/2023 from Dr. Rolle. The injections significantly improved symptoms on the right hand but did not feel any improvement on the left. He has been experiencing this pain for over 2 years and has tried bracing, anti-inflammatory medications, topical cream, arthritis gloves, and ice which provided little to no relief. He was unable to work on Saturday and today due to pain.         Please refer to New Patient Intake Form scanned into patient's electronic record for self-reported past medical history, past surgical history, medications, allergies, family history, social history and 10 point review of systems.        Examination:     Constitutional: Oriented to person, place, and time.     Appears well-developed and well-nourished.     Head: Normocephalic and atraumatic.     Eyes: Pupils are equal, round, and reactive to light.     Cardiovascular: Intact distal pulses.     Pulmonary/Chest/Breast: Effort normal. No respiratory  distress.     Neurological: Alert and oriented to person, place, and time.     Skin: Skin is warm and dry.     Psychiatric: normal mood and affect. Behavior is normal.     Musculoskeletal: Left hand examination reveals soft tissue swelling and bony prominence at the base of the thumb.  Markedly positive thumb CMC grind test with crepitus..  CMC joint instability demonstrated.  No MP joint hyperextension stability.  No thenar atrophy.  Sensation subjectively normal.         X-rays of the bilateral wrist taken 7/11/2023 demonstrate pantrapezial arthritis bilaterally, worse on the left than right.                 Impression: Left Thumb Arthritis         Plan: The patient was given an injection upon request but was told if injection fails then reconstruction surgery to the thumb will be considered next.  Also advised that we would not continue to do injections in this frequency given the fact that he had injections attempted 6 weeks ago by another provider.    Procedure Note: Risks and benefits of steroid injection discussed with patient. Risks include but are not limited to: pain, infection, allergic reaction to injected medications, changes in the color or appearance of the skin near the location site and along lymphatic drainage pathway, lack of response, cartilage damage, ligament / tendon rupture, and glycemic control issues in diabetic patients. Patient expresses understanding of risks and elects to proceed. Verbal consent was provided and a time out procedure was performed to confirm the appropriate injection location. Using aseptic technique 20 mg triamcinolone and 0.5 cc 1% lidocaine were injected into the left thumb CMC joint. The patient tolerated the injection well. Post injection instructions were provided.     S Inj/Asp: L thumb CMC on 1/30/2024 8:45 PM  Indications: pain  Details: 25 G needle, dorsal approach  Medications: 20 mg triamcinolone acetonide 40 mg/mL; 0.5 mL lidocaine 10 mg/mL (1 %)  Outcome:  tolerated well, no immediate complications  Procedure, treatment alternatives, risks and benefits explained, specific risks discussed. Consent was given by the patient. Immediately prior to procedure a time out was called to verify the correct patient, procedure, equipment, support staff and site/side marked as required. Patient was prepped and draped in the usual sterile fashion.             Follow-up as needed         iMngo Hanna MD          [facility]     Department of Orthopaedic Surgery     Chief of Hand and Upper Extremity Surgery     [facility]     Scribe Attestation  By signing my name below, I Amol Noriegafahad, Scribe   attest that this documentation has been prepared under the direction and in the presence of Mingo Hanna MD.

## 2024-03-08 ENCOUNTER — OFFICE VISIT (OUTPATIENT)
Dept: PRIMARY CARE | Facility: CLINIC | Age: 54
End: 2024-03-08
Payer: COMMERCIAL

## 2024-03-08 ENCOUNTER — TELEPHONE (OUTPATIENT)
Dept: PRIMARY CARE | Facility: CLINIC | Age: 54
End: 2024-03-08

## 2024-03-08 VITALS
WEIGHT: 223 LBS | BODY MASS INDEX: 31.22 KG/M2 | HEART RATE: 76 BPM | SYSTOLIC BLOOD PRESSURE: 158 MMHG | HEIGHT: 71 IN | DIASTOLIC BLOOD PRESSURE: 89 MMHG | OXYGEN SATURATION: 95 % | TEMPERATURE: 97.5 F

## 2024-03-08 DIAGNOSIS — J06.9 URI WITH COUGH AND CONGESTION: Primary | ICD-10-CM

## 2024-03-08 DIAGNOSIS — J06.9 URI WITH COUGH AND CONGESTION: ICD-10-CM

## 2024-03-08 PROCEDURE — 1036F TOBACCO NON-USER: CPT | Performed by: STUDENT IN AN ORGANIZED HEALTH CARE EDUCATION/TRAINING PROGRAM

## 2024-03-08 PROCEDURE — 99213 OFFICE O/P EST LOW 20 MIN: CPT | Performed by: STUDENT IN AN ORGANIZED HEALTH CARE EDUCATION/TRAINING PROGRAM

## 2024-03-08 PROCEDURE — 3077F SYST BP >= 140 MM HG: CPT | Performed by: STUDENT IN AN ORGANIZED HEALTH CARE EDUCATION/TRAINING PROGRAM

## 2024-03-08 PROCEDURE — 3079F DIAST BP 80-89 MM HG: CPT | Performed by: STUDENT IN AN ORGANIZED HEALTH CARE EDUCATION/TRAINING PROGRAM

## 2024-03-08 RX ORDER — LIDOCAINE 50 MG/G
1 PATCH TOPICAL DAILY
Qty: 14 PATCH | Refills: 0 | Status: SHIPPED | OUTPATIENT
Start: 2024-03-08 | End: 2024-03-08 | Stop reason: SDUPTHER

## 2024-03-08 RX ORDER — BENZONATATE 200 MG/1
200 CAPSULE ORAL NIGHTLY PRN
Qty: 30 CAPSULE | Refills: 0 | Status: SHIPPED | OUTPATIENT
Start: 2024-03-08 | End: 2024-04-07

## 2024-03-08 RX ORDER — LIDOCAINE 50 MG/G
1 PATCH TOPICAL DAILY
Qty: 14 PATCH | Refills: 0 | Status: SHIPPED | OUTPATIENT
Start: 2024-03-08

## 2024-03-08 RX ORDER — GUAIFENESIN 600 MG/1
1200 TABLET, EXTENDED RELEASE ORAL 2 TIMES DAILY
Qty: 56 TABLET | Refills: 0 | Status: SHIPPED | OUTPATIENT
Start: 2024-03-08 | End: 2024-03-22

## 2024-03-08 NOTE — PROGRESS NOTES
Subjective   Patient ID: Adalberto Franklin Jr. is a 53 y.o. male who presents for Otitis Media (Right ear pain ) and Cough (Tues started and vomited with it; When he coughs it hurts the right side of his chest; Covid - as of yesterday ).  HPI  Adalberto is here for sick visit.    Symptoms started 3 days ago. Started with rhinorrhea, nasal congestion, right ear pressure, cough is minimally productive, endorses post nasal drip. Reports generalized pressure headache. He has been using coricidin for his symptoms. He took an advil for his headache. Body aches at baseline. Energy has been low, feels fatigued. Denies facial pain, fevers, chills, sore throat. Pain on the side when he cough.     Review of Systems  12-point ROS was reviewed and is negative, unless otherwise noted in HPI    Objective   Vitals:    03/08/24 1213   BP: 158/89   Pulse: 76   Temp: 36.4 °C (97.5 °F)   SpO2: 95%      Physical Exam  GEN: alert, conversant, NAD  HEENT: PERRL, EOMI, MMM, TMS pearly gray bilaterally  NECK: supple, no LAD appreciated  CHEST: CTAB, tenderness on palpation over right sided anterior rib musculoture  CV: S1, S2, RRR, no murmurs appreciated  ABD: soft, NT, ND  EXT: no significant LE edema  SKIN: warm, dry    Assessment/Plan   #acute viral URI with cough and congestion  #muscular right sided rib pain 2/2 cough  Timecourse, lack of fever, constellation of symptoms point to viral etiology  - Given script for saline nasal spray, mucinex, tessalon pearles.  - advised to stay well hydrated, resting regularly, okay to continue coricidin PRN  - given lidocaine patches, use tylenol PRN. Advised against ibuprofen use while taking daily mobic.  - Advised to call for worsening symptoms in the next 3-4 days, for any fevers/chills, or significant SOB/sputum production    RTC as scheduled, sooner PRN     Gavin Woodward,

## 2024-04-01 DIAGNOSIS — I10 PRIMARY HYPERTENSION: ICD-10-CM

## 2024-04-02 RX ORDER — AMLODIPINE BESYLATE 5 MG/1
5 TABLET ORAL DAILY
Qty: 90 TABLET | Refills: 1 | Status: SHIPPED | OUTPATIENT
Start: 2024-04-02

## 2024-05-13 DIAGNOSIS — I10 PRIMARY HYPERTENSION: ICD-10-CM

## 2024-05-13 RX ORDER — LISINOPRIL 20 MG/1
20 TABLET ORAL DAILY
Qty: 30 TABLET | Refills: 0 | Status: SHIPPED | OUTPATIENT
Start: 2024-05-13 | End: 2024-11-09

## 2024-05-16 ENCOUNTER — OFFICE VISIT (OUTPATIENT)
Dept: PRIMARY CARE | Facility: CLINIC | Age: 54
End: 2024-05-16
Payer: COMMERCIAL

## 2024-05-16 VITALS
DIASTOLIC BLOOD PRESSURE: 80 MMHG | WEIGHT: 220 LBS | OXYGEN SATURATION: 97 % | BODY MASS INDEX: 30.8 KG/M2 | SYSTOLIC BLOOD PRESSURE: 146 MMHG | HEIGHT: 71 IN | HEART RATE: 102 BPM

## 2024-05-16 DIAGNOSIS — M77.11 LATERAL EPICONDYLITIS OF RIGHT ELBOW: Primary | ICD-10-CM

## 2024-05-16 PROCEDURE — 3079F DIAST BP 80-89 MM HG: CPT | Performed by: STUDENT IN AN ORGANIZED HEALTH CARE EDUCATION/TRAINING PROGRAM

## 2024-05-16 PROCEDURE — 99213 OFFICE O/P EST LOW 20 MIN: CPT | Performed by: STUDENT IN AN ORGANIZED HEALTH CARE EDUCATION/TRAINING PROGRAM

## 2024-05-16 PROCEDURE — 3077F SYST BP >= 140 MM HG: CPT | Performed by: STUDENT IN AN ORGANIZED HEALTH CARE EDUCATION/TRAINING PROGRAM

## 2024-05-16 PROCEDURE — 1036F TOBACCO NON-USER: CPT | Performed by: STUDENT IN AN ORGANIZED HEALTH CARE EDUCATION/TRAINING PROGRAM

## 2024-05-16 RX ORDER — CYCLOBENZAPRINE HCL 5 MG
5 TABLET ORAL 3 TIMES DAILY PRN
Qty: 30 TABLET | Refills: 0 | Status: SHIPPED | OUTPATIENT
Start: 2024-05-16 | End: 2024-07-15

## 2024-05-16 RX ORDER — METHYLPREDNISOLONE 4 MG/1
TABLET ORAL
Qty: 21 TABLET | Refills: 0 | Status: SHIPPED | OUTPATIENT
Start: 2024-05-16 | End: 2024-05-23

## 2024-05-16 ASSESSMENT — PATIENT HEALTH QUESTIONNAIRE - PHQ9
SUM OF ALL RESPONSES TO PHQ9 QUESTIONS 1 AND 2: 0
2. FEELING DOWN, DEPRESSED OR HOPELESS: NOT AT ALL
1. LITTLE INTEREST OR PLEASURE IN DOING THINGS: NOT AT ALL

## 2024-05-16 ASSESSMENT — ENCOUNTER SYMPTOMS
BRUISES/BLEEDS EASILY: 0
WOUND: 0
SHORTNESS OF BREATH: 0
FEVER: 0
WEAKNESS: 0
CHILLS: 0
JOINT SWELLING: 0
NUMBNESS: 0
PALPITATIONS: 0
ARTHRALGIAS: 1

## 2024-05-16 NOTE — PROGRESS NOTES
"Subjective   Patient ID: Adalberto Franklin Jr. is a 53 y.o. male who presents for Elbow Pain (Left elbow pain for 6 days).    Patient here for left elbow pain. He hit it on a bathroom sink 5 days ago. No swelling or bruising. Since then, he has intermittent pain, described as throbbing. It is worse with activity but can occur while at rest too. He has tried a sleeve, ice, advil with some improvement. He works at [employer] and lifts boxes, so has had to take off work this week due to his pain. No shoulder pain. He is right handed. No numbness or tingling. He had a pin placed in his left elbow when he was younger after a fracture.     Review of Systems   Constitutional:  Negative for chills and fever.   Respiratory:  Negative for shortness of breath.    Cardiovascular:  Negative for chest pain and palpitations.   Musculoskeletal:  Positive for arthralgias. Negative for joint swelling.   Skin:  Negative for rash and wound.   Neurological:  Negative for weakness and numbness.   Hematological:  Does not bruise/bleed easily.   All other systems reviewed and are negative.      Objective   /80   Pulse 102   Ht 1.803 m (5' 11\")   Wt 99.8 kg (220 lb)   SpO2 97%   BMI 30.68 kg/m²     Physical Exam  Vitals and nursing note reviewed.   Constitutional:       General: He is not in acute distress.     Appearance: He is not ill-appearing or toxic-appearing.   HENT:      Head: Normocephalic and atraumatic.      Right Ear: External ear normal.      Left Ear: External ear normal.   Eyes:      Conjunctiva/sclera: Conjunctivae normal.   Cardiovascular:      Rate and Rhythm: Normal rate and regular rhythm.   Pulmonary:      Effort: Pulmonary effort is normal.   Musculoskeletal:         General: No swelling or deformity.      Cervical back: Neck supple.      Comments: Left lateral epicondyle tenderness. Pain worse with resisted wrist extension.   Skin:     General: Skin is warm and dry.      Findings: No bruising, erythema or rash. "   Neurological:      General: No focal deficit present.      Mental Status: He is alert and oriented to person, place, and time.   Psychiatric:         Behavior: Behavior normal.         Assessment/Plan   #Left lateral epicondylitis  -Avoid triggering activities. Continue NSAID. Trial brace. If not improving, then ortho referral.    RTC as previously scheduled or earlier PRN    Patient seen on sick visit.  Signs and symptoms consistent with lateral epicondylitis, advised Medrol Dosepak NSAIDs and Flexeril as needed.  Occupational Therapy referral as well as Ortho referral.  Patient will need to attend these in order to appropriately fully heal.  Work note provided.  Patient will call with worsening symptoms or concerns.

## 2024-05-29 NOTE — PROGRESS NOTES
Occupational Therapy Evaluation    Patient Name:  Adalberto Franklin Jr.   Patient MRN: [id number]  Date: 5/30/2024  Time Calculation  Start Time: 1015  Stop Time: 1100  Time Calculation (min): 45 min    OT Evaluation Time Entry  OT Evaluation (Low) Time Entry: 15  OT Therapeutic Procedures Time Entry  Manual Therapy Time Entry: 15  Therapeutic Exercise Time Entry: 5  OT Modalities Time Entry  Ultrasound Time Entry: 10                ASSESSMENT:  Patient was referred to occupational therapy for an evaluation and treatment s/p hitting L arm on bathroom sink, resulting in L lateral epicondylitis. OT evaluation completed this date.  Patient's main functional deficits include lifting.  Patient would benefit from skilled OT in order to decrease pain and muscle tightness in L elbow and to increase LUE AROM to improve   Patient was issued written and illustrated handouts for forearm stretches, wrist/elbow isometrics, and wrist eccentric extension stretches for HEP to address these deficits. Patient verbalizes good understanding of HEP.    PLAN:   OT intervention plan includes: education/instruction, home program, manual therapy, therapeutic activities, therapeutic exercises, and modalities.   Frequency and duration:1 time(s) a week, for 4weeks .   Potential to achieve rehab goals is good.     Plan of care was developed with input and agreement by the patient.     Insurance:  Visit number: 1 of 4  Insurance Type: Payor: IRON / Plan: IRON [place] / Product Type: *No Product type* /   Authorization or Plan of Care date Range: danny year  Copay: n/a  Referred by: Tom Rincon DO     SUBJECTIVE:  Patient is a 53 old who attends OT evaluation today.  he  reports he has been experiencing L elbow pain since hitting his arm on the bathroom sink. Patient has tried a sleeve, ice, advil with some improvement. Patient scheduled a follow up with PCP regarding the  pain. PCP recommended a tennis elbow brace and provided OT and orthopedic referral. Patient rates current pain  in L elbow a 6/10, with resting pain at a 3/10. Pain is located over lateral epicondyle, posterior forearm, and olecranon process. Experiences intermittent n/t from elbow to digits. Notes he has no difficulties completing ADLs, IADLs, and work tasks, however experiences pain during all activities. He has had experienced tennis elbow in the past and has a tennis elbow brace. Reports he will begin wearing brace. Would like to decrease pain and muscle tightness in L elbow to improve performance in work tasks.     he is RHD   his chief complaint is pain in L elbow.  his goal for Occupational Therapy is to decrease L elbow pain          he lives with his girlfriend in a house. 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    General:  Reason for visit: L  lateral epicondylitis   Referred by: Tom Rincon DO     PMH includes: RA, HBP    Medical Screening:   Reviewed medical history form with patient and medical screening assessed.     Precautions: none   Fall Risk: None          Pain Assessment:      Pain Assessment: 0-10      Pain (0-10): 6; 3 @ rest       Pain Location L elbow    OBJECTIVE:  Active range of motion:  L Elbow:  - Flexion: 125  - Extension: -5  - Supination: 80  - Pronation: 90    L Wrist:  - Flexion: 70  - Extension: 65  - Rd dev: 20  - Ul dev: 25    Strength (MMT):  L Elbow: WNL    L Wrist: WNL    Will assess  and pinch NV    Outcome Measures:  OT Adult Other Outcome Measures:   9 Hole Peg Test: R: 22.1 secs      L: 22.9 secs  OT Adult Other Outcome Measures  Other Outcome Measures: 15 (QuickDASH)  (9.09%)    Observations:  - (+) Mill's  - (+) Cozen's  - (+) Middle finger resistance test    Goals:  Patient will present with a pain score of 2/10 in L elbow.    Patient to increase elbow extension AROM by 5 degrees in order to improve independent performance in daily activities.     Patient to  "improve QuickDASH score to at or below 9% to increase independency in ADLs and IADLs.     Patient will report understanding of home program, demonstrate independence and verbalize precautions.    Treatment Performed: (\"NP\" = Not Performed)     Treatment:  Low Complex OT Eval: 15 mins    Therapeutic Exercise: 5 mins  - Demonstrated forearm stretches, wrist/elbow isometrics, and wrist eccentric extension stretches. Provided HEP handout   Therapeutic Activities: NP  -   Manual Therapy: 15 mins  - STM, graston, and trigger point therapy to posterior forearm and lateral epicondyle  Neuromuscular Re-Education: NP  -   Modalities: 10 mins  - Ultrasound to lateral epicondyle 3Mhz, 1.0W/cm2, continuous 10 min     Education: Reviewed home exercise program. Access Code [email or web address]  "

## 2024-05-30 ENCOUNTER — EVALUATION (OUTPATIENT)
Dept: OCCUPATIONAL THERAPY | Facility: CLINIC | Age: 54
End: 2024-05-30
Payer: COMMERCIAL

## 2024-05-30 DIAGNOSIS — M77.11 LATERAL EPICONDYLITIS OF RIGHT ELBOW: ICD-10-CM

## 2024-05-30 DIAGNOSIS — M77.12 LEFT LATERAL EPICONDYLITIS: Primary | ICD-10-CM

## 2024-05-30 PROCEDURE — 97022 WHIRLPOOL THERAPY: CPT | Mod: GO

## 2024-05-30 PROCEDURE — 97035 APP MDLTY 1+ULTRASOUND EA 15: CPT | Mod: GO

## 2024-05-30 PROCEDURE — 97140 MANUAL THERAPY 1/> REGIONS: CPT | Mod: GO

## 2024-05-30 PROCEDURE — 97165 OT EVAL LOW COMPLEX 30 MIN: CPT | Mod: GO

## 2024-05-30 ASSESSMENT — PATIENT HEALTH QUESTIONNAIRE - PHQ9
2. FEELING DOWN, DEPRESSED OR HOPELESS: NOT AT ALL
SUM OF ALL RESPONSES TO PHQ9 QUESTIONS 1 AND 2: 0
1. LITTLE INTEREST OR PLEASURE IN DOING THINGS: NOT AT ALL

## 2024-05-30 ASSESSMENT — PAIN SCALES - GENERAL: PAINLEVEL_OUTOF10: 6

## 2024-05-30 ASSESSMENT — ENCOUNTER SYMPTOMS
OCCASIONAL FEELINGS OF UNSTEADINESS: 0
LOSS OF SENSATION IN FEET: 0
DEPRESSION: 0

## 2024-05-30 ASSESSMENT — PAIN - FUNCTIONAL ASSESSMENT: PAIN_FUNCTIONAL_ASSESSMENT: 0-10

## 2024-06-11 DIAGNOSIS — I10 PRIMARY HYPERTENSION: ICD-10-CM

## 2024-06-12 ENCOUNTER — DOCUMENTATION (OUTPATIENT)
Dept: OCCUPATIONAL THERAPY | Facility: CLINIC | Age: 54
End: 2024-06-12
Payer: COMMERCIAL

## 2024-06-12 RX ORDER — LISINOPRIL 20 MG/1
20 TABLET ORAL DAILY
Qty: 90 TABLET | Refills: 0 | Status: SHIPPED | OUTPATIENT
Start: 2024-06-12 | End: 2024-09-10

## 2024-06-12 NOTE — PROGRESS NOTES
Occupational Therapy                 Therapy Communication Note    Patient Name: Adalberto Franklin Jr.  MRN: [id number]  Today's Date: 6/12/2024     Discipline: Occupational Therapy    Missed Time: No Show    Comment: Pt no call, no show for OT session this afternoon. Called and left pt VM with reminder of today's appointment and to call and reschedule. Provided number to call clinic.

## 2024-06-17 NOTE — PROGRESS NOTES
"                                                             OCCUPATIONAL THERAPY TREATMENT NOTE    Patient Name:  Adalberto Franklin Jr.   Patient MRN: [id number]  Date: 6/19/2024       Insurance:  Visit number: 2 of 4  Insurance Type: Payor: IRON / Plan: [place]ON [employer] [place] / Product Type: *No Product type* /   Authorization or Plan of Care date Range: danny year   Copay: n/a  Referred by: Tom Rincon DO                              General:  Reason for visit: L  lateral epicondylitis   Referred by: Tom Rincon DO     Assessment:  Progress towards functional goals: {Progress towards functional goals:20165}  Response to interventions: {response to intervention:24973}  Justification for continued skilled care: {Justification for continued skilled care:19768}    Plan:  {Daily note plan:25773}    Therapy diagnoses: No diagnosis found.     Subjective:  {subjective in daily note:95028}  Progress towards functional goal:  {Progress towards functional goals:77438}  Pain Location L elbow  Pain (0-10): {TJP Pain 0-10:64688}   HEP adherence / understanding: {HEP compliant on reassess:55364::\"compliance with the instructed home exercises.\"}    Precautions:  Fall Risk: None    Precautions listed: none    Objective: NP    Treatment Performed: (\"NP\" = Not Performed)     Therapeutic Exercise:  -   Therapeutic Activities:   -   Manual Therapy: 15 mins  - STM, graston, and trigger point therapy to posterior forearm and lateral epicondyle    Neuromuscular Re-Education:   -   Modalities: 10 mins  - Ultrasound to lateral epicondyle 3Mhz, 1.0W/cm2, continuous 10 min     Education: {Education:34880}  "

## 2024-06-19 ENCOUNTER — DOCUMENTATION (OUTPATIENT)
Dept: OCCUPATIONAL THERAPY | Facility: CLINIC | Age: 54
End: 2024-06-19
Payer: COMMERCIAL

## 2024-06-19 ENCOUNTER — APPOINTMENT (OUTPATIENT)
Dept: OCCUPATIONAL THERAPY | Facility: CLINIC | Age: 54
End: 2024-06-19
Payer: COMMERCIAL

## 2024-06-19 NOTE — PROGRESS NOTES
Occupational Therapy                 Therapy Communication Note    Patient Name: Adalberto Franklin Jr.  MRN: [id number]  Today's Date: 6/19/2024     Discipline: Occupational Therapy    Missed Time: Cancel    Comment: Patient called clinic to cancel today's session. Stated he was called into work. This is patient's second consecutive cancellation/no show. Patient's next scheduled OT session is on 06/26/24.

## 2024-06-24 NOTE — PROGRESS NOTES
"                                                             OCCUPATIONAL THERAPY TREATMENT NOTE    Patient Name:  Adalberto Franklin Jr.   Patient MRN: [id number]  Date: 6/26/2024       Insurance:  Visit number: 2 of 4  Insurance Type: Payor: IRON / Plan: [place]ON [employer] [place] / Product Type: *No Product type* /   Authorization or Plan of Care date Range: danny year   Copay: n/a   Referred by: Tom Rincon DO                              General:  Reason for visit: L  lateral epicondylitis   Referred by: Tom Rincon DO     Assessment:  Progress towards functional goals: {Progress towards functional goals:88680}  Response to interventions: {response to intervention:85829}  Justification for continued skilled care: {Justification for continued skilled care:84086}    Plan:  {Daily note plan:65230}    Therapy diagnoses: No diagnosis found.     Subjective:  {subjective in daily note:77668}  Progress towards functional goal:  {Progress towards functional goals:58200}  Pain Location L elbow  Pain (0-10): {TJP Pain 0-10:62722}   HEP adherence / understanding: {HEP compliant on reassess:07875::\"compliance with the instructed home exercises.\"}    Precautions:  Fall Risk: None    Precautions listed: none    Objective: NP    Treatment Performed: (\"NP\" = Not Performed)     Therapeutic Exercise:  -   Therapeutic Activities:   -   Manual Therapy: 15 mins  - STM, graston, and trigger point therapy to posterior forearm and lateral epicondyle    Neuromuscular Re-Education:   -   Modalities: 10 mins  - Ultrasound to lateral epicondyle 3Mhz, 1.0W/cm2, continuous 10 min     Education: {Education:72055}  "

## 2024-06-26 ENCOUNTER — APPOINTMENT (OUTPATIENT)
Dept: OCCUPATIONAL THERAPY | Facility: CLINIC | Age: 54
End: 2024-06-26
Payer: COMMERCIAL

## 2024-06-26 ENCOUNTER — DOCUMENTATION (OUTPATIENT)
Dept: OCCUPATIONAL THERAPY | Facility: CLINIC | Age: 54
End: 2024-06-26
Payer: COMMERCIAL

## 2024-06-26 NOTE — PROGRESS NOTES
Occupational Therapy    Discharge Summary    Name: Adalberto Franklin Jr.  MRN: [id number]  : 1970  Date: 24    Discharge Summary: OT    Discharge Information: Date of discharge 24    Therapy Summary: Patient was referred to OT for L lateral epicondylitis.     Discharge Status: Patient has not returned to OT since initial evaluation on 24. Patient has cancelled/no showed 3 consecutive OT sessions. Patient will be discharged from OT per the cancellation/no show policy. Therapist left patient a VM informing him of his discharge from OT. Call back number was provided.      Rehab Discharge Reason: Attendance inconsistent and Failed to schedule and/or keep follow-up appointment(s)

## 2024-08-22 ENCOUNTER — OFFICE VISIT (OUTPATIENT)
Dept: PRIMARY CARE | Facility: CLINIC | Age: 54
End: 2024-08-22
Payer: COMMERCIAL

## 2024-08-22 VITALS
DIASTOLIC BLOOD PRESSURE: 76 MMHG | WEIGHT: 224 LBS | SYSTOLIC BLOOD PRESSURE: 142 MMHG | HEART RATE: 86 BPM | BODY MASS INDEX: 31.24 KG/M2 | OXYGEN SATURATION: 94 %

## 2024-08-22 DIAGNOSIS — M79.645 PAIN OF LEFT THUMB: Primary | ICD-10-CM

## 2024-08-22 DIAGNOSIS — E55.9 VITAMIN D DEFICIENCY: ICD-10-CM

## 2024-08-22 DIAGNOSIS — Z00.00 HEALTH CARE MAINTENANCE: ICD-10-CM

## 2024-08-22 DIAGNOSIS — Z12.11 COLON CANCER SCREENING: ICD-10-CM

## 2024-08-22 PROCEDURE — 99213 OFFICE O/P EST LOW 20 MIN: CPT | Performed by: STUDENT IN AN ORGANIZED HEALTH CARE EDUCATION/TRAINING PROGRAM

## 2024-08-22 PROCEDURE — 1036F TOBACCO NON-USER: CPT | Performed by: STUDENT IN AN ORGANIZED HEALTH CARE EDUCATION/TRAINING PROGRAM

## 2024-08-22 PROCEDURE — 3078F DIAST BP <80 MM HG: CPT | Performed by: STUDENT IN AN ORGANIZED HEALTH CARE EDUCATION/TRAINING PROGRAM

## 2024-08-22 PROCEDURE — 3077F SYST BP >= 140 MM HG: CPT | Performed by: STUDENT IN AN ORGANIZED HEALTH CARE EDUCATION/TRAINING PROGRAM

## 2024-08-22 RX ORDER — MELOXICAM 15 MG/1
15 TABLET ORAL DAILY
Qty: 90 TABLET | Refills: 1 | Status: SHIPPED | OUTPATIENT
Start: 2024-08-22 | End: 2025-02-18

## 2024-08-22 ASSESSMENT — ENCOUNTER SYMPTOMS
WEAKNESS: 0
ARTHRALGIAS: 1
JOINT SWELLING: 0
FEVER: 0
CHILLS: 0
NUMBNESS: 0
SHORTNESS OF BREATH: 0
PALPITATIONS: 0
BRUISES/BLEEDS EASILY: 0
WOUND: 0

## 2024-08-22 NOTE — PROGRESS NOTES
Subjective   Patient ID: Adalberto Franklin Jr. is a 54 y.o. male who presents for Arthritis.    Reports that the arthritis in his hands have started acting up over the past week. Reports that the pain is worse with use. Reports that it is hard to do his work due to the pain, makes the work very unpleasant. Heating pad has not helped. Ran out of the meloxicam and the pain is tied with him running out; was taking it once a day in the morning. Denies CP/SOB, N/V, F/C, abdominal pain, LH/dizziness.    Review of Systems   Constitutional:  Negative for chills and fever.   Respiratory:  Negative for shortness of breath.    Cardiovascular:  Negative for chest pain and palpitations.   Musculoskeletal:  Positive for arthralgias. Negative for joint swelling.   Skin:  Negative for rash and wound.   Neurological:  Negative for weakness and numbness.   Hematological:  Does not bruise/bleed easily.   All other systems reviewed and are negative.      Objective   /76 (BP Location: Right arm, Patient Position: Sitting)   Pulse 86   Wt 102 kg (224 lb)   SpO2 94%   BMI 31.24 kg/m²     Physical Exam  Vitals and nursing note reviewed.   Constitutional:       General: He is not in acute distress.     Appearance: He is not ill-appearing or toxic-appearing.   HENT:      Head: Normocephalic and atraumatic.      Right Ear: External ear normal.      Left Ear: External ear normal.   Eyes:      Conjunctiva/sclera: Conjunctivae normal.   Cardiovascular:      Rate and Rhythm: Normal rate and regular rhythm.   Pulmonary:      Effort: Pulmonary effort is normal.   Musculoskeletal:         General: No swelling or deformity.      Cervical back: Neck supple.      Comments: + finklestein's on left   Skin:     General: Skin is warm and dry.      Findings: No bruising, erythema or rash.   Neurological:      General: No focal deficit present.      Mental Status: He is alert and oriented to person, place, and time.   Psychiatric:         Behavior:  Behavior normal.         Assessment/Plan   #Arthritis of the Bilateral Hands  -referral to ortho for injection  -refill of meloxicam, informed patient of the longterm risks including stomach ulcers  -recommended PT/OT    #Health Maintenance  -routine labs due today  -recommended colonoscopy, deferred at this time  -recommended routine vaccinations, deferred at this time    RTC as previously scheduled or earlier PRN    Zara Melgar MD  PGY-2 Internal Medicine  Staffed with the attending physician Dr. Rinocn.      Patient seen in conjunction with resident physician, recommend initiation of meloxicam discussed long-term side effects continue supportive care, recommend lab work and colonoscopy ordered today.    Return to care in 6 to 12 months or as needed Ortho referral

## 2024-09-04 ENCOUNTER — OFFICE VISIT (OUTPATIENT)
Dept: ORTHOPEDIC SURGERY | Facility: CLINIC | Age: 54
End: 2024-09-04
Payer: COMMERCIAL

## 2024-09-04 VITALS — WEIGHT: 224 LBS | HEIGHT: 71 IN | BODY MASS INDEX: 31.36 KG/M2

## 2024-09-04 DIAGNOSIS — M18.0 PRIMARY OSTEOARTHRITIS OF BOTH FIRST CARPOMETACARPAL JOINTS: ICD-10-CM

## 2024-09-04 DIAGNOSIS — M79.645 BILATERAL THUMB PAIN: Primary | ICD-10-CM

## 2024-09-04 DIAGNOSIS — M79.644 BILATERAL THUMB PAIN: Primary | ICD-10-CM

## 2024-09-04 DIAGNOSIS — M79.645 PAIN OF LEFT THUMB: ICD-10-CM

## 2024-09-04 PROCEDURE — 1036F TOBACCO NON-USER: CPT | Performed by: ORTHOPAEDIC SURGERY

## 2024-09-04 PROCEDURE — 99213 OFFICE O/P EST LOW 20 MIN: CPT | Performed by: ORTHOPAEDIC SURGERY

## 2024-09-04 PROCEDURE — 2500000005 HC RX 250 GENERAL PHARMACY W/O HCPCS: Performed by: ORTHOPAEDIC SURGERY

## 2024-09-04 PROCEDURE — 20600 DRAIN/INJ JOINT/BURSA W/O US: CPT | Mod: 50 | Performed by: ORTHOPAEDIC SURGERY

## 2024-09-04 PROCEDURE — 3008F BODY MASS INDEX DOCD: CPT | Performed by: ORTHOPAEDIC SURGERY

## 2024-09-04 PROCEDURE — 2500000004 HC RX 250 GENERAL PHARMACY W/ HCPCS (ALT 636 FOR OP/ED): Performed by: ORTHOPAEDIC SURGERY

## 2024-09-04 RX ORDER — TRIAMCINOLONE ACETONIDE 40 MG/ML
10 INJECTION, SUSPENSION INTRA-ARTICULAR; INTRAMUSCULAR
Status: COMPLETED | OUTPATIENT
Start: 2024-09-04 | End: 2024-09-04

## 2024-09-04 RX ORDER — LIDOCAINE HYDROCHLORIDE 20 MG/ML
0.5 INJECTION, SOLUTION INFILTRATION; PERINEURAL
Status: COMPLETED | OUTPATIENT
Start: 2024-09-04 | End: 2024-09-04

## 2024-09-04 NOTE — PROGRESS NOTES
Subjective    Patient ID: Adalberto Diggs Jr. is a 54 y.o. male.    Chief Complaint: Pain of the Left Thumb and Pain of the Right Thumb     Last Surgery: No surgery found  Last Surgery Date: No surgery found    HPI  Patient is a 54-year-old male who comes in with a complaint of bilateral thumb pain.  He has been seen for right first CMC arthritis with that injection having been performed about 9 months ago.  He has not had any previous treatment recently for the left side.  He currently denies any trauma but did notice exacerbation of his symptoms within the past week.    Objective   Ortho Exam  Patient is in no acute distress.  Exam of his left hand and wrist reveals his skin envelope is intact.  He does have swelling near the first CMC joint.  He is tender at the first CMC joint.  He has a positive for CMC grind.  He has a negative Finkelstein test.    Exam of his right hand and wrist reveals he is tender at the first CMC joint.  He has a positive first CMC grind.  He has a negative Finkelstein test.    Image Results:  XR wrist right 3+ views  Narrative: Interpreted By:  ALEKSANDR WALDROP MD  MRN: [id number]  Patient Name: ADALBERTO DIGGS     STUDY:  BILATERAL WRIST COMPLT; MIN 3 VIEWS;  7/11/2023 12:20 pm     INDICATION:  wrist pain bilaterally.     COMPARISON:  None.     ACCESSION NUMBER(S):  [id number]     ORDERING CLINICIAN:  IRINA CRAIG     FINDINGS:  Bilateral wrists, three views of each     There is severe joint space narrowing osteophytosis and sclerosis in  the 1st CMC and triscaphe joints on the left with moderate changes on  the right to include the midcarpal and triscaphe joints.. No erosions  or chondrocalcinosis seen. No soft tissue abnormality     Impression: Severe degenerative change on the left at the base of the thumb.  Moderate osteoarthritis in the midcarpal and triscaphe joints on the  right. No erosions or chondrocalcinosis      Assessment/Plan   Encounter Diagnoses:  Bilateral thumb  pain    Pain of left thumb    Primary osteoarthritis of both first carpometacarpal joints    Patient has bilateral first CMC arthritis.  We discussed conservative versus surgical management.  He wished to undergo Kenalog injection of both CMC joints.    S Inj/Asp: bilateral thumb CMC on 9/4/2024 10:57 AM  Indications: pain  Details: 25 G needle, dorsal approach  Medications (Right): 10 mg triamcinolone acetonide 40 mg/mL; 0.5 mL lidocaine 20 mg/mL (2 %)  Medications (Left): 10 mg triamcinolone acetonide 40 mg/mL; 0.5 mL lidocaine 20 mg/mL (2 %)  Outcome: tolerated well, no immediate complications  Procedure, treatment alternatives, risks and benefits explained, specific risks discussed. Consent was given by the patient. Immediately prior to procedure a time out was called to verify the correct patient, procedure, equipment, support staff and site/side marked as required. Patient was prepped and draped in the usual sterile fashion.       Patient was informed that takes approximately week for the injections have an effect.  He may otherwise use both hands is much as he can tolerate.  He will follow-up as her symptoms dictate.

## 2024-09-16 DIAGNOSIS — I10 PRIMARY HYPERTENSION: ICD-10-CM

## 2024-09-16 RX ORDER — LISINOPRIL 20 MG/1
20 TABLET ORAL DAILY
Qty: 90 TABLET | Refills: 0 | Status: SHIPPED | OUTPATIENT
Start: 2024-09-16 | End: 2024-12-15

## 2024-09-18 ENCOUNTER — OFFICE VISIT (OUTPATIENT)
Dept: ORTHOPEDIC SURGERY | Facility: CLINIC | Age: 54
End: 2024-09-18
Payer: COMMERCIAL

## 2024-09-18 DIAGNOSIS — M18.12 PRIMARY OSTEOARTHRITIS OF FIRST CARPOMETACARPAL JOINT OF LEFT HAND: Primary | ICD-10-CM

## 2024-09-18 PROCEDURE — 99213 OFFICE O/P EST LOW 20 MIN: CPT | Performed by: ORTHOPAEDIC SURGERY

## 2024-09-18 PROCEDURE — 1036F TOBACCO NON-USER: CPT | Performed by: ORTHOPAEDIC SURGERY

## 2024-09-18 RX ORDER — BUPIVACAINE HYDROCHLORIDE 5 MG/ML
10 INJECTION, SOLUTION EPIDURAL; INTRACAUDAL ONCE
OUTPATIENT
Start: 2024-09-18 | End: 2024-09-18

## 2024-09-18 RX ORDER — CEFAZOLIN SODIUM 2 G/100ML
2 INJECTION, SOLUTION INTRAVENOUS ONCE
OUTPATIENT
Start: 2024-09-18 | End: 2024-09-18

## 2024-09-18 RX ORDER — SODIUM CHLORIDE, SODIUM LACTATE, POTASSIUM CHLORIDE, CALCIUM CHLORIDE 600; 310; 30; 20 MG/100ML; MG/100ML; MG/100ML; MG/100ML
20 INJECTION, SOLUTION INTRAVENOUS CONTINUOUS
OUTPATIENT
Start: 2024-09-18

## 2024-09-18 NOTE — PROGRESS NOTES
Subjective    Patient ID: Adalberto Diggs Jr. is a 54 y.o. male.    Chief Complaint: Follow-up of the Left Thumb (FUV BL 1ST CMC INJ 9/4/24 /PT STATES THE INJ WAS HELPFUL FOR THE R THUMB, BUT NOT FOR THE LEFT) and Follow-up of the Right Thumb (FUV BL 1ST CMC INJ 9/4/24 /PT STATES THE INJ WAS HELPFUL FOR THE R THUMB, BUT NOT FOR THE LEFT)     Last Surgery: No surgery found  Last Surgery Date: No surgery found    HPI  Patient returns today for follow-up of his left thumb pain secondary to first CMC arthritis.  He has undergone previous Kenalog injections with the most recent being about 2 weeks ago.  The most recent injection has not provided any relief.  He returns today to discuss the next stage and treatment.    Objective   Ortho Exam  Patient is in no acute distress.  Exam of his left hand and wrist reveals the skin envelope is intact.  He is exquisitely tender to palpation over the first CMC joint.  He has a positive first CMC grind.  He has a negative Finkelstein test.  There is no evidence of a trigger thumb.    Image Results:  XR wrist right 3+ views  Narrative: Interpreted By:  ALEKSANDR WALDROP MD  MRN: [id number]  Patient Name: ADALBERTO DIGGS     STUDY:  BILATERAL WRIST COMPLT; MIN 3 VIEWS;  7/11/2023 12:20 pm     INDICATION:  wrist pain bilaterally.     COMPARISON:  None.     ACCESSION NUMBER(S):  [id number]     ORDERING CLINICIAN:  IRINA CRAIG     FINDINGS:  Bilateral wrists, three views of each     There is severe joint space narrowing osteophytosis and sclerosis in  the 1st CMC and triscaphe joints on the left with moderate changes on  the right to include the midcarpal and triscaphe joints.. No erosions  or chondrocalcinosis seen. No soft tissue abnormality     Impression: Severe degenerative change on the left at the base of the thumb.  Moderate osteoarthritis in the midcarpal and triscaphe joints on the  right. No erosions or chondrocalcinosis      Assessment/Plan   Encounter Diagnoses:  Primary  osteoarthritis of first carpometacarpal joint of left hand    Orders Placed This Encounter    Request for Pre-Admission Testing Visit    Basic Metabolic Panel    Case Request Operating Room: Arthroplasty Interposition Upper Extremity     Patient has known left for CMC arthritis.  He has undergone previous conservative management of Kenalog injections and modified use.  However he is still symptomatic.  He now would like to undergo surgery.  I discussed with him in detail the risk, benefits alternatives of a left first CMC arthroplasty with trapezium excision and first metacarpal suspension.  The patient voiced understanding and informed consent was obtained.  The patient will call to schedule surgery.

## 2024-09-20 PROBLEM — M18.12 PRIMARY OSTEOARTHRITIS OF FIRST CARPOMETACARPAL JOINT OF LEFT HAND: Status: ACTIVE | Noted: 2024-09-18

## 2024-10-04 ENCOUNTER — PRE-ADMISSION TESTING (OUTPATIENT)
Dept: PREADMISSION TESTING | Facility: HOSPITAL | Age: 54
End: 2024-10-04
Payer: COMMERCIAL

## 2024-10-04 VITALS
WEIGHT: 223.11 LBS | OXYGEN SATURATION: 98 % | BODY MASS INDEX: 30.22 KG/M2 | HEIGHT: 72 IN | HEART RATE: 80 BPM | SYSTOLIC BLOOD PRESSURE: 146 MMHG | TEMPERATURE: 97.9 F | DIASTOLIC BLOOD PRESSURE: 76 MMHG | RESPIRATION RATE: 18 BRPM

## 2024-10-04 DIAGNOSIS — I10 PRIMARY HYPERTENSION: ICD-10-CM

## 2024-10-04 DIAGNOSIS — D64.9 ANEMIA, UNSPECIFIED TYPE: ICD-10-CM

## 2024-10-04 DIAGNOSIS — I10 HYPERTENSION, UNSPECIFIED TYPE: ICD-10-CM

## 2024-10-04 DIAGNOSIS — M18.12 PRIMARY OSTEOARTHRITIS OF FIRST CARPOMETACARPAL JOINT OF LEFT HAND: ICD-10-CM

## 2024-10-04 DIAGNOSIS — D50.9 IRON DEFICIENCY ANEMIA, UNSPECIFIED IRON DEFICIENCY ANEMIA TYPE: ICD-10-CM

## 2024-10-04 DIAGNOSIS — Z01.818 PRE-OP TESTING: Primary | ICD-10-CM

## 2024-10-04 LAB
ANION GAP SERPL CALC-SCNC: 13 MMOL/L (ref 10–20)
BUN SERPL-MCNC: 11 MG/DL (ref 6–23)
CALCIUM SERPL-MCNC: 9.4 MG/DL (ref 8.6–10.3)
CHLORIDE SERPL-SCNC: 101 MMOL/L (ref 98–107)
CO2 SERPL-SCNC: 25 MMOL/L (ref 21–32)
CREAT SERPL-MCNC: 0.8 MG/DL (ref 0.5–1.3)
EGFRCR SERPLBLD CKD-EPI 2021: >90 ML/MIN/1.73M*2
GLUCOSE SERPL-MCNC: 96 MG/DL (ref 74–99)
HCT VFR BLD AUTO: 36.7 % (ref 41–52)
HGB BLD-MCNC: 11.7 G/DL (ref 13.5–17.5)
POTASSIUM SERPL-SCNC: 4.9 MMOL/L (ref 3.5–5.3)
SODIUM SERPL-SCNC: 134 MMOL/L (ref 136–145)

## 2024-10-04 PROCEDURE — 82728 ASSAY OF FERRITIN: CPT | Mod: PARLAB

## 2024-10-04 PROCEDURE — 83540 ASSAY OF IRON: CPT

## 2024-10-04 PROCEDURE — 82746 ASSAY OF FOLIC ACID SERUM: CPT | Mod: PARLAB

## 2024-10-04 PROCEDURE — 85014 HEMATOCRIT: CPT

## 2024-10-04 PROCEDURE — 82374 ASSAY BLOOD CARBON DIOXIDE: CPT

## 2024-10-04 PROCEDURE — 99204 OFFICE O/P NEW MOD 45 MIN: CPT | Performed by: NURSE PRACTITIONER

## 2024-10-04 PROCEDURE — 87081 CULTURE SCREEN ONLY: CPT | Mod: PARLAB

## 2024-10-04 PROCEDURE — 80048 BASIC METABOLIC PNL TOTAL CA: CPT

## 2024-10-04 PROCEDURE — 82607 VITAMIN B-12: CPT | Mod: PARLAB

## 2024-10-04 PROCEDURE — 93005 ELECTROCARDIOGRAM TRACING: CPT

## 2024-10-04 PROCEDURE — 36415 COLL VENOUS BLD VENIPUNCTURE: CPT

## 2024-10-04 RX ORDER — CHLORHEXIDINE GLUCONATE ORAL RINSE 1.2 MG/ML
15 SOLUTION DENTAL DAILY
Qty: 30 ML | Refills: 0 | Status: SHIPPED | OUTPATIENT
Start: 2024-10-04 | End: 2024-10-06

## 2024-10-04 RX ORDER — AMLODIPINE BESYLATE 5 MG/1
5 TABLET ORAL DAILY
Qty: 90 TABLET | Refills: 1 | Status: SHIPPED | OUTPATIENT
Start: 2024-10-04

## 2024-10-04 RX ORDER — CHLORHEXIDINE GLUCONATE 40 MG/ML
SOLUTION TOPICAL DAILY
Qty: 118 ML | Refills: 0 | Status: SHIPPED | OUTPATIENT
Start: 2024-10-04 | End: 2024-10-09

## 2024-10-04 ASSESSMENT — DUKE ACTIVITY SCORE INDEX (DASI)
CAN YOU CLIMB A FLIGHT OF STAIRS OR WALK UP A HILL: YES
TOTAL_SCORE: 36.7
CAN YOU DO HEAVY WORK AROUND THE HOUSE LIKE SCRUBBING FLOORS OR LIFTING AND MOVING HEAVY FURNITURE: NO
CAN YOU TAKE CARE OF YOURSELF (EAT, DRESS, BATHE, OR USE TOILET): YES
CAN YOU PARTICIPATE IN MODERATE RECREATIONAL ACTIVITIES LIKE GOLF, BOWLING, DANCING, DOUBLES TENNIS OR THROWING A BASEBALL OR FOOTBALL: NO
CAN YOU HAVE SEXUAL RELATIONS: YES
DASI METS SCORE: 7.3
CAN YOU WALK INDOORS, SUCH AS AROUND YOUR HOUSE: YES
CAN YOU DO LIGHT WORK AROUND THE HOUSE LIKE DUSTING OR WASHING DISHES: YES
CAN YOU WALK A BLOCK OR TWO ON LEVEL GROUND: YES
CAN YOU DO MODERATE WORK AROUND THE HOUSE LIKE VACUUMING, SWEEPING FLOORS OR CARRYING GROCERIES: YES
CAN YOU PARTICIPATE IN STRENOUS SPORTS LIKE SWIMMING, SINGLES TENNIS, FOOTBALL, BASKETBALL, OR SKIING: NO
CAN YOU DO YARD WORK LIKE RAKING LEAVES, WEEDING OR PUSHING A MOWER: YES
CAN YOU RUN A SHORT DISTANCE: YES

## 2024-10-04 NOTE — PREPROCEDURE INSTRUCTIONS
Medication List            Accurate as of October 4, 2024  1:57 PM. Always use your most recent med list.                amLODIPine 5 mg tablet  Commonly known as: Norvasc  Take 1 tablet (5 mg) by mouth once daily.  Medication Adjustments for Surgery: Take/Use as prescribed     capsicum 0.075 % topical cream  Commonly known as: Zostrix  Apply topically 3 times a day.  Medication Adjustments for Surgery: Take/Use as prescribed  Additional Medication Adjustments for Surgery: Other (Comment)  Notes to patient: Avoid surgical site     * chlorhexidine 0.12 % solution  Commonly known as: Peridex  Use 15 mL in the mouth or throat once daily for 2 doses. Swish and Spit day before surgery and again morning on day of surgery.     * chlorhexidine 4 % external liquid  Commonly known as: Hibiclens  Apply topically once daily for 5 days. Wash daily for 5 days prior to surgery with day 5 being morning of surgery.     clobetasol 0.05 % cream  Commonly known as: Temovate  Medication Adjustments for Surgery: Take/Use as prescribed  Additional Medication Adjustments for Surgery: Other (Comment)  Notes to patient: Avoid surgical site     fish oil 60- mg capsule  Commonly known as: Omega-3  Take 2 capsules (1,000 mg) by mouth once daily.  Additional Medication Adjustments for Surgery: Take last dose 7 days before surgery     hydrocortisone 2.5 % cream  Medication Adjustments for Surgery: Take/Use as prescribed  Additional Medication Adjustments for Surgery: Other (Comment)  Notes to patient: Avoid surgical site     lidocaine 5 % patch  Commonly known as: Lidoderm  Place 1 patch over 12 hours on the skin once daily.  Additional Medication Adjustments for Surgery: Other (Comment)  Notes to patient: Remove patch day of surgery     lisinopril 20 mg tablet  Take 1 tablet (20 mg) by mouth once daily. NEEDS APPT FOR FURTHER REFILLS  Medication Adjustments for Surgery: Take last dose 1 day (24 hours) before surgery  Additional  Medication Adjustments for Surgery: Other (Comment)  Notes to patient: Do not take night before or day of surgery - (Do not take 24 hours before surgery)     meloxicam 15 mg tablet  Commonly known as: Mobic  Take 1 tablet (15 mg) by mouth once daily.  Additional Medication Adjustments for Surgery: Take last dose 7 days before surgery     sodium chloride 0.65 % nasal spray  Commonly known as: Ocean  Administer 1 spray into each nostril if needed for congestion.  Medication Adjustments for Surgery: Take/Use as prescribed     traZODone 100 mg tablet  Commonly known as: Desyrel  Take 1 tablet (100 mg) by mouth once daily at bedtime.  Medication Adjustments for Surgery: Take/Use as prescribed     triamcinolone 0.1 % cream  Commonly known as: Kenalog  Medication Adjustments for Surgery: Take/Use as prescribed  Additional Medication Adjustments for Surgery: Other (Comment)  Notes to patient: Avoid surgical site           * This list has 2 medication(s) that are the same as other medications prescribed for you. Read the directions carefully, and ask your doctor or other care provider to review them with you.              PRE-OPERATIVE INSTRUCTIONS FOR SURGERY    PLEASE REVIEW YOUR MEDICATION LIST so as to define which medications should be held or stopped prior to your procedure , as well as which meds should be taken the day of surgery with sips of clearl liquid.    *Do not eat anything after midnight the night of surgery.  This includes food of any kind (including hard candy, cough drops, mints).   You may have up to 13.5 ounces of clear liquid  until TWO hours prior to your arrival time to the hospital Clear liquids include water, black tea/coffee, (no milk or cream) apple juice and electrolyte drinks (GATORADE).  You may chew gum until TWO hours prior you your surgery/procedure.       -------------------------------------------------    *One of our staff members will call you ONE business day before your surgery,  between 11 am-2 pm to let you know the time to arrive.    If you have not received a call by 2 pm, call [phone]    *When you arrive at the hospital-->GO TO Registration on the ground floor    *Stop smoking 24 hours prior to surgery.      No Marijuana, CBD Oil or Vaping for 48 hours    *No alcohol 24 hours prior to surgery    *You will need a responsible adult to drive you home    Be sure to  your specialty pre-surgical shower gel and oral rinse solution from your pharmacy.  Please use as directed.    -No acrylic nails or nail polish on at least one fingernail, NO polish on toes for foot surgery    -You may be asked to remove your dentures, partial plate, eyeglasses or contact lenses before going to surgery.  Please bring a case for these items.    -Body piercings need to be removed.  Jewelry and valuables should be left at home.    -Put on loose,  comfortable, clean clothing, that will accommodate bandages    -------------------------------------------------    What is a home antibacterial shower?  This shower is a way of cleaning the skin with a germ killing solution before surgery.  The solution contains chlorhexidine, commonly known as CHG.  CHG is a skin cleanser with germ killing ability.  Let your doctor know if you are allergic to chlorhexidine.    Why do I need to take a preoperative antibacterial shower?  Skin is not sterile.  It is best to try to make your skin as free of germs as possible before surgery.  Proper cleansing with a germ killing soap before surgery can lower the number of germs on your skin.  This helps to reduce the risk of infection at the surgical site.  Following the instructions listed below will help you prepare your skin for surgery.      How do I use the solution?    Steps: Begin using your CHG soap 5 days before your surgery on ____10/15/24_________.    *First, wash and rinse your hair using the CHG soap.  Keep CHG soap away from ear canals and eyes.   Rinse completely,  do not condition.  Hair extensions should be removed.    *Wash your face with your normal soap and rinse.   *Apply the CHG solution to a clean wet washcloth.  Turn the water off or move away from the water spray to avoid premature rinsing of the CHG soap as you are applying.  Firmly lather your entire body from the neck down.  Do not use on your face.    *Pay special attention to the area(s) where your incision(s) will be located unless they are on your face.  Avoid scrubbing your skin too hard.  The important part is to have the CHG soap sit on your skin for 3 minutes.   *When the 3 minutes are up, turn on the water and rinse the CHG solution off your body completely.  *Do not wash with regular soap after you  have  used the CHG soap solution.  *Pat  yourself dry with a clean, freshly laundered towel.  *Do not apply powders, deodorants or lotions.  *Dress in clean freshly laundered night clothes.    *Be sure to sleep with clean freshly laundered sheets.    *Be aware the CHG will cause stains on fabrics; if you wash them with bleach after use.  Rinse your washcloth and other linens that have contact with CHG completely.  Use only non-chlorine detergents to launder the items  used.  *The morning of surgery is the fifth day.  Repeat the above steps and dress in clean comfortable clothing.         What is oral/dental rinse?  It is mouthwash.  It is a way of cleaning the he mouth with a germ-killing solution before your surgery.  The solution contains chlorhexidine, commonly known as CHG.  It is used inside the mouth to kill a bacteria known as Staphylococcus aureus.  Let your doctor know if you are allergic to Chlorhexidine.    Why do I need to use CHG oral/ dental rinse?  The CHG oral/dental rinse helps to kill bacteria in your mouth know as Staphylococcus aureus.  This reduces the risk of infection at the surgical site.    Using your CHG oral/dental rinse    STEPS:    Use your CHG oral/dental rinse after you brush  your teeth the night before (at bedtime) and the morning of your surgery.  Follow all the directions on your prescription label.  *Use the cap on the container to measure 15 ml  *Swish (gargle if you can) the mouthwash in your mouth for at least 30 seconds, (do not swallow) and spit out  *After you use your CHG rinse, do not rinse your mouth with water, drink or eat.  Please refer to the prescription label for the appropriate time to resume oral intake.    What side effects might I have using the CHG oral/dental rinse?  CHG rinse will stick you plaque on the teeth.  Brush and floss just before use.   Teeth brushing will help avoid staining of the plaque during  use.  Teeth brushing will help avoid staining of plaque during  use.    Who should I contact if I have questions about the CHG oral/dental rinse and or CHG soap?  Please call [facility], Pre-Admission testing at [phone] if you have any questions.    -------------------------------------------------    What you may be asked to bring to surgery:    ___Photo ID and insurance information    ------------------------------------------------  NPO Instructions:  Do not eat any food after midnight the night before your surgery/procedure.  You may have clear liquids until TWO hours before surgery/procedure. This includes water, black tea/coffee, (no milk or cream) apple juice and electrolyte drinks (Gatorade).  You may chew gum up to TWO hours before your surgery/procedure.    Additional Instructions:         Day of Surgery:  Review your medication instructions, take indicated medications  You may have clear liquids until TWO hours before surgery/procedure.  This includes water, black tea/coffee, (no milk or cream) apple juice and electrolyte drinks (Gatorade)  You may chew gum up to TWO hours before your surgery/procedure  Wear  comfortable loose fitting clothing  Do not use moisturizers, creams, lotions or perfume  All jewelry  and valuables should be left at home    -------------------------------------------------

## 2024-10-04 NOTE — H&P (VIEW-ONLY)
CPM/PAT Evaluation       Name: Adalberto Franklin Jr. (Adalberto Franklin Jr.)  /Age: 1970/54 y.o.     In-Person       Chief Complaint: PAT for planned LUE procedure    54 yr old male w/PHx of HTN, obesity and OA of Left hand referred to PAT for planned Left first carpometacarpal arthroplasty w/c-arm w/Dr Rolle on 10/15/2024     Patient reports feeling overall well, denies fever, cough or recent infection. Reports remaining otherwise physically active; denies cardiac or respiratory symptoms.  No previous adult general anesthesia, only had surgery to Left elbow w/hardware as a child.        Followed by PCP (Sergey) - last visit 2024        Past Medical History:   Diagnosis Date    Hypertension        Past Surgical History:   Procedure Laterality Date    NO PAST SURGERIES      OTHER SURGICAL HISTORY Left     Left elbow w/hardware as a child in 5th grade       Patient  has no history on file for sexual activity.    Family History   Problem Relation Name Age of Onset    Other (Hepatic failure) Mother      Heart attack Father      Hypertension Brother         No Known Allergies    Prior to Admission medications    Medication Sig Start Date End Date Taking? Authorizing Provider   amLODIPine (Norvasc) 5 mg tablet Take 1 tablet (5 mg) by mouth once daily. 24  Yes Tom Rincon,    fish oil (Omega-3) 60- mg capsule Take 2 capsules (1,000 mg) by mouth once daily. 23  Yes Tom Rincon DO   hydrocortisone 2.5 % cream twice a day. APPLY SPARINGLY TO AFFECTED AREA(S) 21  Yes Historical Provider, MD   lidocaine (Lidoderm) 5 % patch Place 1 patch over 12 hours on the skin once daily. 3/8/24  Yes Gavin Woodward,    lisinopril 20 mg tablet Take 1 tablet (20 mg) by mouth once daily. NEEDS APPT FOR FURTHER REFILLS 9/16/24 12/15/24 Yes Tom Rincon DO   meloxicam (Mobic) 15 mg tablet Take 1 tablet (15 mg) by mouth once daily. 24 Yes Tom Rincon DO   sodium chloride  (Ocean) 0.65 % nasal spray Administer 1 spray into each nostril if needed for congestion. 3/8/24 3/8/25 Yes Gavin Woodward, DO   traZODone (Desyrel) 100 mg tablet Take 1 tablet (100 mg) by mouth once daily at bedtime. 1/12/24 10/4/24 Yes Tom Rincon, DO   triamcinolone (Kenalog) 0.1 % cream Apply 1 Film topically. 1/26/15  Yes Historical Provider, MD   capsicum (Zostrix) 0.075 % topical cream Apply topically 3 times a day.  Patient not taking: Reported on 10/4/2024 1/12/24 1/11/25  Tom Rincon, DO   clobetasol (Temovate) 0.05 % cream Apply 1 Application topically 2 times a day. TO AFFECTED AREAS ON BODY UNTIL CLEAR. AVOID USING ON FACE  GROIN  AND UNDERARMS. 5/16/22   Historical Provider, MD        Review of Systems    Constitutional: no fever, no chills and not feeling poorly.   Eyes: no eyesight problems.   ENT: no hearing loss, no nosebleeds and no sore throat.   Cardiovascular: no chest pain, no palpitations and no extremity edema.   Respiratory: no sob, no wheezing, no cough and no sob w/exertion.   Gastrointestinal: negative for abdominal pain, blood in stools or changes in bowel habits   Genitourinary: negative for dysuria, incontinence or changes in urinary habits   Musculoskeletal: no arthralgias, ambulates independently.   Integumentary: negative for lesions, rash or itching.   Neurological: negative for confusion, dizziness or fainting.   Psychiatric: not suicidal, no anxiety and no depression.   All other systems have been reviewed and are negative for complaint.     PAT Physical Exam     PAT AIRWAY:   Airway:     Mallampati::  I    TM distance::  >3 FB    Neck ROM::  Full  normal        Visit Vitals  /76   Pulse 80   Temp 36.6 °C (97.9 °F) (Temporal)   Resp 18       DASI Risk Score      Flowsheet Row Pre-Admission Testing from 10/4/2024 in [facility]   Can you take care of yourself (eat, dress, bathe, or use toilet)?  2.75 filed at 10/04/2024 1321   Can you walk indoors,  such as around your house? 1.75 filed at 10/04/2024 1321   Can you walk a block or two on level ground?  2.75 filed at 10/04/2024 1321   Can you climb a flight of stairs or walk up a hill? 5.5 filed at 10/04/2024 1321   Can you run a short distance? 8 filed at 10/04/2024 1321   Can you do light work around the house like dusting or washing dishes? 2.7 filed at 10/04/2024 1321   Can you do moderate work around the house like vacuuming, sweeping floors or carrying groceries? 3.5 filed at 10/04/2024 1321   Can you do heavy work around the house like scrubbing floors or lifting and moving heavy furniture?  0 filed at 10/04/2024 1321   Can you do yard work like raking leaves, weeding or pushing a mower? 4.5 filed at 10/04/2024 1321   Can you have sexual relations? 5.25 filed at 10/04/2024 1321   Can you participate in moderate recreational activities like golf, bowling, dancing, doubles tennis or throwing a baseball or football? 0 filed at 10/04/2024 1321   Can you participate in strenous sports like swimming, singles tennis, football, basketball, or skiing? 0 filed at 10/04/2024 1321   DASI SCORE 36.7 filed at 10/04/2024 1321   METS Score (Will be calculated only when all the questions are answered) 7.3 filed at 10/04/2024 1321          Caprini DVT Assessment    No data to display       Modified Frailty Index    No data to display       CHADS2 Stroke Risk  Current as of 32 minutes ago        N/A 3 to 100%: High Risk   2 to < 3%: Medium Risk   0 to < 2%: Low Risk     Last Change: N/A          This score determines the patient's risk of having a stroke if the patient has atrial fibrillation.        This score is not applicable to this patient. Components are not calculated.          Revised Cardiac Risk Index    No data to display       Apfel Simplified Score    No data to display       Risk Analysis Index Results This Encounter    No data found in the last 10 encounters.       Stop Bang Score      Flowsheet Row  Pre-Admission Testing from 10/4/2024 in [facility]   Do you snore loudly? 0 filed at 10/04/2024 1321   Do you often feel tired or fatigued after your sleep? 0 filed at 10/04/2024 1321   Has anyone ever observed you stop breathing in your sleep? 0 filed at 10/04/2024 1321   Do you have or are you being treated for high blood pressure? 1 filed at 10/04/2024 1321   Recent BMI (Calculated) 31.3 filed at 10/04/2024 1321   Is BMI greater than 35 kg/m2? 0=No filed at 10/04/2024 1321   Age older than 50 years old? 1=Yes filed at 10/04/2024 1321   Is your neck circumference greater than 17 inches (Male) or 16 inches (Female)? 0 filed at 10/04/2024 1321   Gender - Male 1=Yes filed at 10/04/2024 1321   STOP-BANG Total Score 3 filed at 10/04/2024 1321            Assessment and Plan:     # HTN - continue amlodipine, hold lisinopril the night before and day of surgery  # obesity - diet/activity discussed/encouraged  # OA of first carpometacarpal joint of Left hand - Left first carpometacarpal arthroplasty w/c-arm w/Dr Rolle on 10/15/2024    Ecg performed today - NSR (74 bpm)  Medical hx, Allergies, VS and Labs reviewed  Medications addressed w/pre-op instructions provided

## 2024-10-04 NOTE — CPM/PAT H&P
CPM/PAT Evaluation       Name: Adalberto Franklin Jr. (Adalberto Franklin Jr.)  /Age: 1970/54 y.o.     In-Person       Chief Complaint: PAT for planned LUE procedure    54 yr old male w/PHx of HTN, obesity and OA of Left hand referred to PAT for planned Left first carpometacarpal arthroplasty w/c-arm w/Dr Rolle on 10/15/2024     Patient reports feeling overall well, denies fever, cough or recent infection. Reports remaining otherwise physically active; denies cardiac or respiratory symptoms.  No previous adult general anesthesia, only had surgery to Left elbow w/hardware as a child.        Followed by PCP (Sergey) - last visit 2024        Past Medical History:   Diagnosis Date    Hypertension        Past Surgical History:   Procedure Laterality Date    NO PAST SURGERIES      OTHER SURGICAL HISTORY Left     Left elbow w/hardware as a child in 5th grade       Patient  has no history on file for sexual activity.    Family History   Problem Relation Name Age of Onset    Other (Hepatic failure) Mother      Heart attack Father      Hypertension Brother         No Known Allergies    Prior to Admission medications    Medication Sig Start Date End Date Taking? Authorizing Provider   amLODIPine (Norvasc) 5 mg tablet Take 1 tablet (5 mg) by mouth once daily. 24  Yes Tom Rincon,    fish oil (Omega-3) 60- mg capsule Take 2 capsules (1,000 mg) by mouth once daily. 23  Yes Tom Rincon DO   hydrocortisone 2.5 % cream twice a day. APPLY SPARINGLY TO AFFECTED AREA(S) 21  Yes Historical Provider, MD   lidocaine (Lidoderm) 5 % patch Place 1 patch over 12 hours on the skin once daily. 3/8/24  Yes Gavin Woodward,    lisinopril 20 mg tablet Take 1 tablet (20 mg) by mouth once daily. NEEDS APPT FOR FURTHER REFILLS 9/16/24 12/15/24 Yes Tom Rincon DO   meloxicam (Mobic) 15 mg tablet Take 1 tablet (15 mg) by mouth once daily. 24 Yes Tom Rincon DO   sodium chloride  (Ocean) 0.65 % nasal spray Administer 1 spray into each nostril if needed for congestion. 3/8/24 3/8/25 Yes Gavin Woodward, DO   traZODone (Desyrel) 100 mg tablet Take 1 tablet (100 mg) by mouth once daily at bedtime. 1/12/24 10/4/24 Yes Tom Rincon, DO   triamcinolone (Kenalog) 0.1 % cream Apply 1 Film topically. 1/26/15  Yes Historical Provider, MD   capsicum (Zostrix) 0.075 % topical cream Apply topically 3 times a day.  Patient not taking: Reported on 10/4/2024 1/12/24 1/11/25  Tom Rincon, DO   clobetasol (Temovate) 0.05 % cream Apply 1 Application topically 2 times a day. TO AFFECTED AREAS ON BODY UNTIL CLEAR. AVOID USING ON FACE  GROIN  AND UNDERARMS. 5/16/22   Historical Provider, MD        Review of Systems    Constitutional: no fever, no chills and not feeling poorly.   Eyes: no eyesight problems.   ENT: no hearing loss, no nosebleeds and no sore throat.   Cardiovascular: no chest pain, no palpitations and no extremity edema.   Respiratory: no sob, no wheezing, no cough and no sob w/exertion.   Gastrointestinal: negative for abdominal pain, blood in stools or changes in bowel habits   Genitourinary: negative for dysuria, incontinence or changes in urinary habits   Musculoskeletal: no arthralgias, ambulates independently.   Integumentary: negative for lesions, rash or itching.   Neurological: negative for confusion, dizziness or fainting.   Psychiatric: not suicidal, no anxiety and no depression.   All other systems have been reviewed and are negative for complaint.     PAT Physical Exam     PAT AIRWAY:   Airway:     Mallampati::  I    TM distance::  >3 FB    Neck ROM::  Full  normal        Visit Vitals  /76   Pulse 80   Temp 36.6 °C (97.9 °F) (Temporal)   Resp 18       DASI Risk Score      Flowsheet Row Pre-Admission Testing from 10/4/2024 in [facility]   Can you take care of yourself (eat, dress, bathe, or use toilet)?  2.75 filed at 10/04/2024 1321   Can you walk indoors,  such as around your house? 1.75 filed at 10/04/2024 1321   Can you walk a block or two on level ground?  2.75 filed at 10/04/2024 1321   Can you climb a flight of stairs or walk up a hill? 5.5 filed at 10/04/2024 1321   Can you run a short distance? 8 filed at 10/04/2024 1321   Can you do light work around the house like dusting or washing dishes? 2.7 filed at 10/04/2024 1321   Can you do moderate work around the house like vacuuming, sweeping floors or carrying groceries? 3.5 filed at 10/04/2024 1321   Can you do heavy work around the house like scrubbing floors or lifting and moving heavy furniture?  0 filed at 10/04/2024 1321   Can you do yard work like raking leaves, weeding or pushing a mower? 4.5 filed at 10/04/2024 1321   Can you have sexual relations? 5.25 filed at 10/04/2024 1321   Can you participate in moderate recreational activities like golf, bowling, dancing, doubles tennis or throwing a baseball or football? 0 filed at 10/04/2024 1321   Can you participate in strenous sports like swimming, singles tennis, football, basketball, or skiing? 0 filed at 10/04/2024 1321   DASI SCORE 36.7 filed at 10/04/2024 1321   METS Score (Will be calculated only when all the questions are answered) 7.3 filed at 10/04/2024 1321          Caprini DVT Assessment    No data to display       Modified Frailty Index    No data to display       CHADS2 Stroke Risk  Current as of 32 minutes ago        N/A 3 to 100%: High Risk   2 to < 3%: Medium Risk   0 to < 2%: Low Risk     Last Change: N/A          This score determines the patient's risk of having a stroke if the patient has atrial fibrillation.        This score is not applicable to this patient. Components are not calculated.          Revised Cardiac Risk Index    No data to display       Apfel Simplified Score    No data to display       Risk Analysis Index Results This Encounter    No data found in the last 10 encounters.       Stop Bang Score      Flowsheet Row  Pre-Admission Testing from 10/4/2024 in [facility]   Do you snore loudly? 0 filed at 10/04/2024 1321   Do you often feel tired or fatigued after your sleep? 0 filed at 10/04/2024 1321   Has anyone ever observed you stop breathing in your sleep? 0 filed at 10/04/2024 1321   Do you have or are you being treated for high blood pressure? 1 filed at 10/04/2024 1321   Recent BMI (Calculated) 31.3 filed at 10/04/2024 1321   Is BMI greater than 35 kg/m2? 0=No filed at 10/04/2024 1321   Age older than 50 years old? 1=Yes filed at 10/04/2024 1321   Is your neck circumference greater than 17 inches (Male) or 16 inches (Female)? 0 filed at 10/04/2024 1321   Gender - Male 1=Yes filed at 10/04/2024 1321   STOP-BANG Total Score 3 filed at 10/04/2024 1321            Assessment and Plan:     # HTN - continue amlodipine, hold lisinopril the night before and day of surgery  # obesity - diet/activity discussed/encouraged  # OA of first carpometacarpal joint of Left hand - Left first carpometacarpal arthroplasty w/c-arm w/Dr Rolle on 10/15/2024    Ecg performed today - NSR (74 bpm)  Medical hx, Allergies, VS and Labs reviewed  Medications addressed w/pre-op instructions provided

## 2024-10-06 LAB — STAPHYLOCOCCUS SPEC CULT: NORMAL

## 2024-10-07 DIAGNOSIS — D64.9 ANEMIA, UNSPECIFIED TYPE: Primary | ICD-10-CM

## 2024-10-07 LAB
FERRITIN SERPL-MCNC: 23 NG/ML (ref 20–300)
FOLATE SERPL-MCNC: 18.2 NG/ML
IRON SATN MFR SERPL: 7 % (ref 25–45)
IRON SERPL-MCNC: 30 UG/DL (ref 35–150)
TIBC SERPL-MCNC: 407 UG/DL (ref 240–445)
UIBC SERPL-MCNC: 377 UG/DL (ref 110–370)
VIT B12 SERPL-MCNC: 363 PG/ML (ref 211–911)

## 2024-10-07 RX ORDER — FERROUS SULFATE 325(65) MG
325 TABLET ORAL
Qty: 30 TABLET | Refills: 11 | Status: SHIPPED | OUTPATIENT
Start: 2024-10-07 | End: 2025-10-07

## 2024-10-09 LAB
ATRIAL RATE: 74 BPM
P AXIS: 19 DEGREES
P OFFSET: 205 MS
P ONSET: 158 MS
PR INTERVAL: 132 MS
Q ONSET: 224 MS
QRS COUNT: 12 BEATS
QRS DURATION: 96 MS
QT INTERVAL: 394 MS
QTC CALCULATION(BAZETT): 437 MS
QTC FREDERICIA: 422 MS
R AXIS: -13 DEGREES
T AXIS: 15 DEGREES
T OFFSET: 421 MS
VENTRICULAR RATE: 74 BPM

## 2024-10-15 ENCOUNTER — APPOINTMENT (OUTPATIENT)
Dept: RADIOLOGY | Facility: HOSPITAL | Age: 54
End: 2024-10-15
Payer: COMMERCIAL

## 2024-10-15 ENCOUNTER — HOSPITAL ENCOUNTER (OUTPATIENT)
Facility: HOSPITAL | Age: 54
Setting detail: OUTPATIENT SURGERY
Discharge: HOME | End: 2024-10-15
Attending: ORTHOPAEDIC SURGERY | Admitting: ORTHOPAEDIC SURGERY
Payer: COMMERCIAL

## 2024-10-15 ENCOUNTER — ANESTHESIA (OUTPATIENT)
Dept: OPERATING ROOM | Facility: HOSPITAL | Age: 54
End: 2024-10-15
Payer: COMMERCIAL

## 2024-10-15 ENCOUNTER — ANESTHESIA EVENT (OUTPATIENT)
Dept: OPERATING ROOM | Facility: HOSPITAL | Age: 54
End: 2024-10-15
Payer: COMMERCIAL

## 2024-10-15 VITALS
DIASTOLIC BLOOD PRESSURE: 89 MMHG | WEIGHT: 223.11 LBS | HEART RATE: 74 BPM | SYSTOLIC BLOOD PRESSURE: 142 MMHG | BODY MASS INDEX: 30.22 KG/M2 | OXYGEN SATURATION: 94 % | RESPIRATION RATE: 16 BRPM | HEIGHT: 72 IN | TEMPERATURE: 97.7 F

## 2024-10-15 DIAGNOSIS — M18.12 PRIMARY OSTEOARTHRITIS OF FIRST CARPOMETACARPAL JOINT OF LEFT HAND: Primary | ICD-10-CM

## 2024-10-15 DIAGNOSIS — Z01.818 PRE-OP TESTING: ICD-10-CM

## 2024-10-15 PROCEDURE — 7100000002 HC RECOVERY ROOM TIME - EACH INCREMENTAL 1 MINUTE: Performed by: ORTHOPAEDIC SURGERY

## 2024-10-15 PROCEDURE — 3700000001 HC GENERAL ANESTHESIA TIME - INITIAL BASE CHARGE: Performed by: ORTHOPAEDIC SURGERY

## 2024-10-15 PROCEDURE — 3600000003 HC OR TIME - INITIAL BASE CHARGE - PROCEDURE LEVEL THREE: Performed by: ORTHOPAEDIC SURGERY

## 2024-10-15 PROCEDURE — 7100000001 HC RECOVERY ROOM TIME - INITIAL BASE CHARGE: Performed by: ORTHOPAEDIC SURGERY

## 2024-10-15 PROCEDURE — 3700000002 HC GENERAL ANESTHESIA TIME - EACH INCREMENTAL 1 MINUTE: Performed by: ORTHOPAEDIC SURGERY

## 2024-10-15 PROCEDURE — 2500000005 HC RX 250 GENERAL PHARMACY W/O HCPCS: Performed by: ORTHOPAEDIC SURGERY

## 2024-10-15 PROCEDURE — 7100000010 HC PHASE TWO TIME - EACH INCREMENTAL 1 MINUTE: Performed by: ORTHOPAEDIC SURGERY

## 2024-10-15 PROCEDURE — 2780000003 HC OR 278 NO HCPCS: Performed by: ORTHOPAEDIC SURGERY

## 2024-10-15 PROCEDURE — A25447 PR REPAIR INTERCARP/CARP-METACARP JT

## 2024-10-15 PROCEDURE — 2500000004 HC RX 250 GENERAL PHARMACY W/ HCPCS (ALT 636 FOR OP/ED): Performed by: ORTHOPAEDIC SURGERY

## 2024-10-15 PROCEDURE — 25447 ARTHRP NTRCRP/CRP/MTCR NTRPS: CPT | Performed by: ORTHOPAEDIC SURGERY

## 2024-10-15 PROCEDURE — 2500000004 HC RX 250 GENERAL PHARMACY W/ HCPCS (ALT 636 FOR OP/ED): Performed by: ANESTHESIOLOGY

## 2024-10-15 PROCEDURE — 2720000007 HC OR 272 NO HCPCS: Performed by: ORTHOPAEDIC SURGERY

## 2024-10-15 PROCEDURE — 88305 TISSUE EXAM BY PATHOLOGIST: CPT | Mod: TC,PARLAB | Performed by: ORTHOPAEDIC SURGERY

## 2024-10-15 PROCEDURE — 2500000004 HC RX 250 GENERAL PHARMACY W/ HCPCS (ALT 636 FOR OP/ED): Mod: JZ | Performed by: ORTHOPAEDIC SURGERY

## 2024-10-15 PROCEDURE — 76000 FLUOROSCOPY <1 HR PHYS/QHP: CPT

## 2024-10-15 PROCEDURE — 7100000009 HC PHASE TWO TIME - INITIAL BASE CHARGE: Performed by: ORTHOPAEDIC SURGERY

## 2024-10-15 PROCEDURE — 2500000004 HC RX 250 GENERAL PHARMACY W/ HCPCS (ALT 636 FOR OP/ED)

## 2024-10-15 PROCEDURE — A25447 PR REPAIR INTERCARP/CARP-METACARP JT: Performed by: ANESTHESIOLOGY

## 2024-10-15 PROCEDURE — C1713 ANCHOR/SCREW BN/BN,TIS/BN: HCPCS | Performed by: ORTHOPAEDIC SURGERY

## 2024-10-15 PROCEDURE — 3600000008 HC OR TIME - EACH INCREMENTAL 1 MINUTE - PROCEDURE LEVEL THREE: Performed by: ORTHOPAEDIC SURGERY

## 2024-10-15 PROCEDURE — 88305 TISSUE EXAM BY PATHOLOGIST: CPT | Performed by: PATHOLOGY

## 2024-10-15 DEVICE — H/W INTERNALBRACE LGMNT AUGMNT REPR KIT
Type: IMPLANTABLE DEVICE | Site: HAND | Status: FUNCTIONAL
Brand: ARTHREX®

## 2024-10-15 RX ORDER — ACETAMINOPHEN 325 MG/1
650 TABLET ORAL EVERY 4 HOURS PRN
Status: DISCONTINUED | OUTPATIENT
Start: 2024-10-15 | End: 2024-10-15 | Stop reason: HOSPADM

## 2024-10-15 RX ORDER — HYDROCODONE BITARTRATE AND ACETAMINOPHEN 5; 325 MG/1; MG/1
1 TABLET ORAL EVERY 6 HOURS PRN
Qty: 28 TABLET | Refills: 0 | Status: SHIPPED | OUTPATIENT
Start: 2024-10-15 | End: 2024-10-29 | Stop reason: SDUPTHER

## 2024-10-15 RX ORDER — BUPIVACAINE HYDROCHLORIDE 5 MG/ML
INJECTION, SOLUTION PERINEURAL AS NEEDED
Status: DISCONTINUED | OUTPATIENT
Start: 2024-10-15 | End: 2024-10-15 | Stop reason: HOSPADM

## 2024-10-15 RX ORDER — CEFAZOLIN SODIUM 2 G/100ML
2 INJECTION, SOLUTION INTRAVENOUS ONCE
Status: COMPLETED | OUTPATIENT
Start: 2024-10-15 | End: 2024-10-15

## 2024-10-15 RX ORDER — PROPOFOL 10 MG/ML
INJECTION, EMULSION INTRAVENOUS AS NEEDED
Status: DISCONTINUED | OUTPATIENT
Start: 2024-10-15 | End: 2024-10-15

## 2024-10-15 RX ORDER — ONDANSETRON HYDROCHLORIDE 2 MG/ML
4 INJECTION, SOLUTION INTRAVENOUS ONCE AS NEEDED
Status: DISCONTINUED | OUTPATIENT
Start: 2024-10-15 | End: 2024-10-15 | Stop reason: HOSPADM

## 2024-10-15 RX ORDER — SODIUM CHLORIDE, SODIUM LACTATE, POTASSIUM CHLORIDE, CALCIUM CHLORIDE 600; 310; 30; 20 MG/100ML; MG/100ML; MG/100ML; MG/100ML
20 INJECTION, SOLUTION INTRAVENOUS CONTINUOUS
Status: DISCONTINUED | OUTPATIENT
Start: 2024-10-15 | End: 2024-10-15

## 2024-10-15 RX ORDER — MIDAZOLAM HYDROCHLORIDE 1 MG/ML
INJECTION, SOLUTION INTRAMUSCULAR; INTRAVENOUS AS NEEDED
Status: DISCONTINUED | OUTPATIENT
Start: 2024-10-15 | End: 2024-10-15

## 2024-10-15 RX ORDER — KETOROLAC TROMETHAMINE 30 MG/ML
INJECTION, SOLUTION INTRAMUSCULAR; INTRAVENOUS AS NEEDED
Status: DISCONTINUED | OUTPATIENT
Start: 2024-10-15 | End: 2024-10-15

## 2024-10-15 RX ORDER — SODIUM CHLORIDE 0.9 G/100ML
IRRIGANT IRRIGATION AS NEEDED
Status: DISCONTINUED | OUTPATIENT
Start: 2024-10-15 | End: 2024-10-15 | Stop reason: HOSPADM

## 2024-10-15 RX ORDER — ONDANSETRON HYDROCHLORIDE 2 MG/ML
INJECTION, SOLUTION INTRAVENOUS AS NEEDED
Status: DISCONTINUED | OUTPATIENT
Start: 2024-10-15 | End: 2024-10-15

## 2024-10-15 RX ORDER — MEPERIDINE HYDROCHLORIDE 25 MG/ML
12.5 INJECTION INTRAMUSCULAR; INTRAVENOUS; SUBCUTANEOUS EVERY 10 MIN PRN
Status: DISCONTINUED | OUTPATIENT
Start: 2024-10-15 | End: 2024-10-15 | Stop reason: HOSPADM

## 2024-10-15 RX ORDER — LIDOCAINE HCL/PF 100 MG/5ML
SYRINGE (ML) INTRAVENOUS AS NEEDED
Status: DISCONTINUED | OUTPATIENT
Start: 2024-10-15 | End: 2024-10-15

## 2024-10-15 RX ORDER — DEXAMETHASONE SODIUM PHOSPHATE 10 MG/ML
6 INJECTION INTRAMUSCULAR; INTRAVENOUS ONCE
Status: DISCONTINUED | OUTPATIENT
Start: 2024-10-15 | End: 2024-10-15 | Stop reason: HOSPADM

## 2024-10-15 RX ORDER — FENTANYL CITRATE 50 UG/ML
INJECTION, SOLUTION INTRAMUSCULAR; INTRAVENOUS AS NEEDED
Status: DISCONTINUED | OUTPATIENT
Start: 2024-10-15 | End: 2024-10-15

## 2024-10-15 RX ORDER — BUPIVACAINE HYDROCHLORIDE 5 MG/ML
10 INJECTION, SOLUTION EPIDURAL; INTRACAUDAL ONCE
Status: DISCONTINUED | OUTPATIENT
Start: 2024-10-15 | End: 2024-10-15 | Stop reason: HOSPADM

## 2024-10-15 RX ORDER — ALBUTEROL SULFATE 0.83 MG/ML
2.5 SOLUTION RESPIRATORY (INHALATION) ONCE AS NEEDED
Status: DISCONTINUED | OUTPATIENT
Start: 2024-10-15 | End: 2024-10-15 | Stop reason: HOSPADM

## 2024-10-15 SDOH — HEALTH STABILITY: MENTAL HEALTH: CURRENT SMOKER: 0

## 2024-10-15 ASSESSMENT — PAIN SCALES - GENERAL
PAINLEVEL_OUTOF10: 9
PAIN_LEVEL: 0
PAINLEVEL_OUTOF10: 10 - WORST POSSIBLE PAIN
PAINLEVEL_OUTOF10: 0 - NO PAIN
PAINLEVEL_OUTOF10: 10 - WORST POSSIBLE PAIN
PAINLEVEL_OUTOF10: 9
PAINLEVEL_OUTOF10: 0 - NO PAIN
PAINLEVEL_OUTOF10: 0 - NO PAIN

## 2024-10-15 ASSESSMENT — PAIN - FUNCTIONAL ASSESSMENT
PAIN_FUNCTIONAL_ASSESSMENT: 0-10

## 2024-10-15 ASSESSMENT — COLUMBIA-SUICIDE SEVERITY RATING SCALE - C-SSRS
2. HAVE YOU ACTUALLY HAD ANY THOUGHTS OF KILLING YOURSELF?: NO
1. IN THE PAST MONTH, HAVE YOU WISHED YOU WERE DEAD OR WISHED YOU COULD GO TO SLEEP AND NOT WAKE UP?: NO
6. HAVE YOU EVER DONE ANYTHING, STARTED TO DO ANYTHING, OR PREPARED TO DO ANYTHING TO END YOUR LIFE?: NO

## 2024-10-15 ASSESSMENT — PAIN DESCRIPTION - LOCATION
LOCATION: HAND
LOCATION: HAND

## 2024-10-15 ASSESSMENT — PAIN DESCRIPTION - DESCRIPTORS
DESCRIPTORS: ACHING;SORE;THROBBING

## 2024-10-15 ASSESSMENT — PAIN DESCRIPTION - ORIENTATION
ORIENTATION: LEFT
ORIENTATION: LEFT

## 2024-10-15 NOTE — ANESTHESIA PROCEDURE NOTES
Airway  Date/Time: 10/15/2024 10:41 AM  Urgency: elective    Airway not difficult    Staffing  Performed: [place]   Authorized by: Ara Johnston MD    Performed by: Nisa Webster  Patient location during procedure: OR    Indications and Patient Condition  Indications for airway management: anesthesia  Spontaneous Ventilation: absent  Sedation level: deep  Preoxygenated: yes  Patient position: sniffing  MILS maintained throughout  Mask difficulty assessment: 0 - not attempted  Planned trial extubation    Final Airway Details  Final airway type: supraglottic airway      Successful airway: classic  Size 4     Number of attempts at approach: 1  Ventilation between attempts: none  Number of other approaches attempted: 0

## 2024-10-15 NOTE — ANESTHESIA PREPROCEDURE EVALUATION
Patient: Adalberto Franklin Jr.    Procedure Information       Date/Time: 10/15/24 1020    Procedure: LEFT FIRST CARPOMETACARPAL ARTHROPLASTY WITH C-ARM (Left: Hand)    Location: [facility] OR 08 / Virtual [facility] OR    Surgeons: Jose R Rolle MD            Relevant Problems   Anesthesia (within normal limits)      Cardiac   (+) HLD (hyperlipidemia)   (+) Hypertension      Hematology   (+) Anemia      Musculoskeletal   (+) Primary osteoarthritis of first carpometacarpal joint of left hand      ID   (+) COVID-19      Skin   (+) Eczema   (+) Eczema craquele   (+) Rash and other nonspecific skin eruption       Clinical information reviewed:                   NPO Detail:  No data recorded     Physical Exam    Airway  Mallampati: III  TM distance: >3 FB  Neck ROM: full     Cardiovascular - normal exam     Dental - normal exam     Pulmonary - normal exam     Abdominal - normal exam             Anesthesia Plan    History of general anesthesia?: yes  History of complications of general anesthesia?: no    ASA 2     general     The patient is not a current smoker.    intravenous induction   Postoperative administration of opioids is intended.  Trial extubation is planned.  Anesthetic plan and risks discussed with patient.    Plan discussed with CRNA.

## 2024-10-15 NOTE — OP NOTE
LEFT FIRST CARPOMETACARPAL ARTHROPLASTY WITH C-ARM (L) Operative Note     Date: 10/15/2024  OR Location: [facility] OR    Name: Adalberto Franklin Jr., : 1970, Age: 54 y.o., MRN: [id number], Sex: male    Diagnosis  Pre-op Diagnosis      * Primary osteoarthritis of first carpometacarpal joint of left hand [M18.12] Post-op Diagnosis     * Primary osteoarthritis of first carpometacarpal joint of left hand [M18.12]     Procedures  LEFT FIRST CARPOMETACARPAL ARTHROPLASTY WITH C-ARM  20839 - CT ARTHRP INTERPOS INTERCARPAL/METACARPAL JOINTS      Surgeons      * Jose R Rolle - Primary    Resident/Fellow/Other Assistant:  Surgeons and Role:  * No surgeons found with a matching role *    Procedure Summary  Anesthesia: General  ASA: II  Anesthesia Staff: Anesthesiologist: Ara Johnston MD  CRNA: AYLIN Pickett-CRNA  SRNA: Nisa Webster  Estimated Blood Loss: 10 mL  Intra-op Medications: * Intraprocedure medication information is unavailable because the case start and end events have not been set *           Anesthesia Record               Intraprocedure I/O Totals          Intake    NaCl 0.9 % bolus 250.00 mL    ceFAZolin (Ancef) 2 g in dextrose (iso)  mL 100.00 mL    Total Intake 350 mL          Specimen:   ID Type Source Tests Collected by Time   1 : LEFT TRAPEZIUM Tissue BONE RESECTION SURGICAL PATHOLOGY EXAM Jose R Rolle MD 10/15/2024 1110        Staff:   Circulator: Edna Ray Person: Ma[place]  Circulator: Wendy Fajardoub Person: Pia         Drains and/or Catheters: * None in log *    Tourniquet Times:     Total Tourniquet Time Documented:  Arm - Upper (Left) - 50 minutes  Total: Arm - Upper (Left) - 50 minutes      Implants:  Implants       Type Name Action Serial No.      Implant REPAIR KIT, LIGAMENT AUGMENTATION, HAND/WRIST INTERNABRACE - [id number] Implanted               Findings: Severe DJD left first CMC joint    Indications: Adalberto Franklin Jr. is an 54 y.o. male who is having surgery for  Primary osteoarthritis of first carpometacarpal joint of left hand [M18.12].  The patient had presented with significant pain and limitations in activities of daily living secondary to arthritis at his left first CMC joint.  He had tried and failed conservative management.  We then discussed surgical treatment options.  I discussed with the patient in detail the risk, benefits alternatives of a left first CMC arthroplasty with trapezium excision and first metacarpal suspension.  I explained to him that the risks of the procedure include but are not limited to infection, iatrogenic fracture, damage to nerves and blood vessels, continued pain and stiffness, as well as risks associate with anesthesia.  The patient voiced understanding and informed consent was obtained.    The patient was seen in the preoperative area. The risks, benefits, complications, treatment options, non-operative alternatives, expected recovery and outcomes were discussed with the patient. The possibilities of reaction to medication, pulmonary aspiration, injury to surrounding structures, bleeding, recurrent infection, the need for additional procedures, failure to diagnose a condition, and creating a complication requiring transfusion or operation were discussed with the patient. The patient concurred with the proposed plan, giving informed consent.  The site of surgery was properly noted/marked if necessary per policy. The patient has been actively warmed in preoperative area. Preoperative antibiotics have been ordered and given within 1 hours of incision. Venous thrombosis prophylaxis have been ordered including bilateral sequential compression devices    Procedure Details: The patient was properly identified in the preoperative waiting area and his left thumb was marked as site of surgery.  Ancef was administered intravenously.  The patient was taken back to the operating room suite placed supine on the OR table.  A nonsterile tourniquet was  applied to the patient's left upper extremity.  After general anesthesia with LMA was administered patient's left upper extremity was then prepped and draped in the usual sterile fashion.  A preoperative verification timeout was taken.  Patient's left upper extremity was exsanguinated with an Esmarch bandage and the tourniquet inflated to 250 mmHg.  Approximately a 6 cm Trident type of incision was made at the base of the first metacarpal.  Sharp dissection was carried through skin and subcutaneous tissue.  Electrocautery was used to achieve hemostasis.  Care was taken to identify and protect branches of the dorsal radial sensory nerve.  A first dorsal compartment release was performed subcutaneously.  At this point I then opened up the dorsal capsule of the first CMC joint.  Dissection was carried around the trapezium.  The trapezium however was excised in pieces.  Care is taken to protect the FCR tendon.  Wound was irrigated with normal saline.  At this point guidewires were placed in the base of the first and second metacarpals.  C-arm fluoroscopy was used to confirm full trapezium excision as well as guidewire placement.  At this point each of the guidewires was drilled over.  An Arthrex swivel lock suture anchor with fiber tape was placed in the base of the first metacarpal.  Pulling on the suture tape then the second suture anchor with the suture tape was placed into the base of the second metacarpal.  At this point however there is still some laxity noted in the portion of the suture tape running along the base of the first metacarpal.  Because of this a second guidewire was placed dorsally in the shaft of the first metacarpal and a guidewire was also placed into the shaft of the second metacarpal radially.  Both of these were drilled over.  A third suture anchor with the tape was placed into the shaft of the first metacarpal and then pulling taut on the suture tape a fourth suture anchor was placed into the  second metacarpal shaft.  At this point there was more tension on the suspension of the first metacarpal.  Suture tape was cut short.  C-arm fluoroscopy was used to confirm proper alignment of the first metacarpal.  I was satisfied with this.  Wound was irrigated with normal saline.  First CMC dorsal capsule was closed with 4-0 FiberWire.  Tourniquet was let down after 50 minutes.  Good vascular return was seen to the patient's left hand and all digits.  Skin was closed with 4-0 nylon suture.  10 mL of half percent plain Marcaine was injected as a local anesthetic.  Sterile bandages consisting of Xeroform, gauze 4 x 4's, Webril was applied to the patient's left thumb and wrist.  Patient was placed in a thumb spica splint.  Patient was awakened from anesthesia and returned to recovery room stable condition.  There are no complications during the case.  All sponge and needle counts were correct at the end of the case.  Complications:  None; patient tolerated the procedure well.    Disposition: PACU - hemodynamically stable.  Condition: stable         Additional Details: None    Attending Attestation: I performed the procedure.    Jose R Rolle  Phone Number: [phone]

## 2024-10-15 NOTE — ANESTHESIA POSTPROCEDURE EVALUATION
Patient: Adalberto Franklin Jr.    Procedure Summary       Date: 10/15/24 Room / Location: [facility] OR 08 / [facility] OR    Anesthesia Start: 1035 Anesthesia Stop: 1220    Procedure: LEFT FIRST CARPOMETACARPAL ARTHROPLASTY WITH C-ARM (Left: Hand) Diagnosis:       Primary osteoarthritis of first carpometacarpal joint of left hand      (Primary osteoarthritis of first carpometacarpal joint of left hand [M18.12])    Surgeons: Jose R Rolle MD Responsible Provider: Ara Johnston MD    Anesthesia Type: general ASA Status: 2            Anesthesia Type: general    Vitals Value Taken Time   /90 10/15/24 1220   Temp 36.5 10/15/24 1220   Pulse 85 10/15/24 1220   Resp 16 10/15/24 1220   SpO2 98 10/15/24 1220       Anesthesia Post Evaluation    Patient location during evaluation: PACU  Patient participation: complete - patient participated  Level of consciousness: awake and alert  Pain score: 0  Pain management: adequate  Airway patency: patent  Cardiovascular status: acceptable and hemodynamically stable  Respiratory status: acceptable, spontaneous ventilation and nasal cannula  Hydration status: acceptable  Postoperative Nausea and Vomiting: none        There were no known notable events for this encounter.

## 2024-10-15 NOTE — BRIEF OP NOTE
Date: 10/15/2024  OR Location: [facility] OR    Name: Adalberto Franklin Jr., : 1970, Age: 54 y.o., MRN: [id number], Sex: male    Diagnosis  Pre-op Diagnosis      * Primary osteoarthritis of first carpometacarpal joint of left hand [M18.12] Post-op Diagnosis     * Primary osteoarthritis of first carpometacarpal joint of left hand [M18.12]     Procedures  LEFT FIRST CARPOMETACARPAL ARTHROPLASTY WITH C-ARM  87949 - [place] ARTHRP INTERPOS INTERCARPAL/METACARPAL JOINTS      Surgeons      * Jose R Rolle - Primary    Resident/Fellow/Other Assistant:  Surgeons and Role:  * No surgeons found with a matching role *    Procedure Summary  Anesthesia: General  ASA: II  Anesthesia Staff: Anesthesiologist: Ara Johnston MD  CRNA: AYLIN Pickett-CRNA  SRNA: Nisa Webster  Estimated Blood Loss: 10 mL  Intra-op Medications: * Intraprocedure medication information is unavailable because the case start and end events have not been set *           Anesthesia Record               Intraprocedure I/O Totals          Intake    NaCl 0.9 % bolus 250.00 mL    ceFAZolin (Ancef) 2 g in dextrose (iso)  mL 100.00 mL    Total Intake 350 mL          Specimen:   ID Type Source Tests Collected by Time   1 : LEFT TRAPEZIUM Tissue BONE RESECTION SURGICAL PATHOLOGY EXAM Jose R Rolle MD 10/15/2024 1110        Staff:   Circulator: Edna Ray Person: Maico  Circulator: Wendy Ray Person: Pia          Findings: Severe DJD left first CMC joint    Complications:  None; patient tolerated the procedure well.     Disposition: PACU - hemodynamically stable.  Condition: stable  Specimens Collected:   ID Type Source Tests Collected by Time   1 : LEFT TRAPEZIUM Tissue BONE RESECTION SURGICAL PATHOLOGY EXAM Jose R Rolle MD 10/15/2024 1110     Attending Attestation: I performed the procedure.    Jose R Rolle  Phone Number: [phone]

## 2024-10-16 ASSESSMENT — PAIN SCALES - GENERAL: PAINLEVEL_OUTOF10: 4

## 2024-10-28 ENCOUNTER — HOSPITAL ENCOUNTER (OUTPATIENT)
Dept: RADIOLOGY | Facility: CLINIC | Age: 54
Discharge: HOME | End: 2024-10-28
Payer: COMMERCIAL

## 2024-10-28 ENCOUNTER — OFFICE VISIT (OUTPATIENT)
Dept: ORTHOPEDIC SURGERY | Facility: CLINIC | Age: 54
End: 2024-10-28
Payer: COMMERCIAL

## 2024-10-28 VITALS — HEIGHT: 73 IN | BODY MASS INDEX: 29.55 KG/M2 | WEIGHT: 223 LBS

## 2024-10-28 DIAGNOSIS — M18.12 PRIMARY OSTEOARTHRITIS OF FIRST CARPOMETACARPAL JOINT OF LEFT HAND: ICD-10-CM

## 2024-10-28 DIAGNOSIS — M18.12 PRIMARY OSTEOARTHRITIS OF FIRST CARPOMETACARPAL JOINT OF LEFT HAND: Primary | ICD-10-CM

## 2024-10-28 LAB
LABORATORY COMMENT REPORT: NORMAL
PATH REPORT.FINAL DX SPEC: NORMAL
PATH REPORT.GROSS SPEC: NORMAL
PATH REPORT.RELEVANT HX SPEC: NORMAL
PATH REPORT.TOTAL CANCER: NORMAL

## 2024-10-28 PROCEDURE — 99211 OFF/OP EST MAY X REQ PHY/QHP: CPT | Performed by: ORTHOPAEDIC SURGERY

## 2024-10-28 PROCEDURE — 3008F BODY MASS INDEX DOCD: CPT | Performed by: ORTHOPAEDIC SURGERY

## 2024-10-28 PROCEDURE — 73140 X-RAY EXAM OF FINGER(S): CPT | Mod: LT

## 2024-10-28 PROCEDURE — 73140 X-RAY EXAM OF FINGER(S): CPT | Mod: LEFT SIDE | Performed by: RADIOLOGY

## 2024-10-29 ENCOUNTER — TELEPHONE (OUTPATIENT)
Dept: ORTHOPEDIC SURGERY | Facility: CLINIC | Age: 54
End: 2024-10-29
Payer: COMMERCIAL

## 2024-10-29 DIAGNOSIS — M18.12 PRIMARY OSTEOARTHRITIS OF FIRST CARPOMETACARPAL JOINT OF LEFT HAND: ICD-10-CM

## 2024-10-29 RX ORDER — HYDROCODONE BITARTRATE AND ACETAMINOPHEN 5; 325 MG/1; MG/1
1 TABLET ORAL EVERY 6 HOURS PRN
Qty: 28 TABLET | Refills: 0 | Status: SHIPPED | OUTPATIENT
Start: 2024-10-29 | End: 2024-11-05

## 2024-11-11 ENCOUNTER — EVALUATION (OUTPATIENT)
Dept: OCCUPATIONAL THERAPY | Facility: CLINIC | Age: 54
End: 2024-11-11
Payer: COMMERCIAL

## 2024-11-11 DIAGNOSIS — M18.12 PRIMARY OSTEOARTHRITIS OF FIRST CARPOMETACARPAL JOINT OF LEFT HAND: ICD-10-CM

## 2024-11-11 DIAGNOSIS — R29.898 DECREASED GRIP STRENGTH OF LEFT HAND: Primary | ICD-10-CM

## 2024-11-11 PROCEDURE — 97022 WHIRLPOOL THERAPY: CPT | Mod: GO | Performed by: OCCUPATIONAL THERAPIST

## 2024-11-11 PROCEDURE — 97110 THERAPEUTIC EXERCISES: CPT | Mod: GO | Performed by: OCCUPATIONAL THERAPIST

## 2024-11-11 PROCEDURE — 97165 OT EVAL LOW COMPLEX 30 MIN: CPT | Mod: GO | Performed by: OCCUPATIONAL THERAPIST

## 2024-11-11 ASSESSMENT — ENCOUNTER SYMPTOMS
OCCASIONAL FEELINGS OF UNSTEADINESS: 0
DEPRESSION: 0
LOSS OF SENSATION IN FEET: 0

## 2024-11-11 ASSESSMENT — PAIN SCALES - GENERAL: PAINLEVEL_OUTOF10: 5 - MODERATE PAIN

## 2024-11-11 ASSESSMENT — PATIENT HEALTH QUESTIONNAIRE - PHQ9
2. FEELING DOWN, DEPRESSED OR HOPELESS: NOT AT ALL
1. LITTLE INTEREST OR PLEASURE IN DOING THINGS: NOT AT ALL
SUM OF ALL RESPONSES TO PHQ9 QUESTIONS 1 AND 2: 0

## 2024-11-11 ASSESSMENT — PAIN - FUNCTIONAL ASSESSMENT: PAIN_FUNCTIONAL_ASSESSMENT: 0-10

## 2024-11-11 NOTE — PROGRESS NOTES
Occupational Therapy Evaluation    Patient Name:  Adalberto Franklin Jr.   Patient MRN: [id number]  Date: 11/11/2024  Time Calculation  Start Time: 1150  Stop Time: 1230  Time Calculation (min): 40 min    OT Evaluation Time Entry  OT Evaluation (Low) Time Entry: 15  OT Therapeutic Procedures Time Entry  Therapeutic Exercise Time Entry: 15  OT Modalities Time Entry  Whirlpool Time Entry: 10                ASSESSMENT:  Patient was referred to occupational therapy for an evaluation and treatment s/p Left thumb CMC arthroplasty. OT evaluation completed this date.  Patient's main functional deficits include inability to lift and carry objects in left hand, unable to open jars/doors, unable to use knife and fork, unable to bathe, groom and dress using left hand and unable to perform work duties.  Patient would benefit from skilled OT in order to increase AROM of left wrist and hand, increase left  and pinch strength, reduce pain levels and reduce left hand swelling.  Patient was issued written and illustrated handouts for thumb AROM, wrist AROM, tubigrip for edema control and splint wearing schedule for HEP to address these deficits. Patient verbalizes good understanding of HEP.    PLAN:       OT intervention plan includes: education/instruction, home program, manual therapy, therapeutic activities, therapeutic exercises, ultrasound, splinting, and fluidotherapy.  Frequency and duration: 1-2 time(s) a week, for 6 weeks.   Potential to achieve rehab goals is good.    Plan of care was developed with input and agreement by the patient.     Insurance:  Visit number: 1   Insurance Type: Payor: IRON / Plan: IRON [facility] [place] / Product Type: *No Product type* /   Authorization or Plan of Care date Range: med necessity  Copay: NA  Referred by: Jose R Rolle MD     SUBJECTIVE:  Patient is a 54 year old who attends OT evaluation today s/p left thumb CMC arthroplasty performed on 10/15/24. he reports his thumb pain  has been worsening the past several years and the cortisone injections were no longer effective in pain reduction.  He presents to OT in a long thumb opponens off shelf brace.    he is RHD   his chief complaint is pain in wrist and hand.  his goal for Occupational Therapy is to return to full use and strength of left hand including return to work.     he lives with girlfriend. he works full time as at [employer] and has been off work since surgical procedure.    General:  Reason for visit: left thumb CMC arthroplasty  Referred by: Dr. Rolle    Type of surgery: Left First Carpometacarpal Arthroplasty With C-arm - Left  Date of surgery: 10/15/2024  Days since surgery: 27        Current Problem:         Problem List Items Addressed This Visit             ICD-10-CM    Primary osteoarthritis of first carpometacarpal joint of left hand M18.12    Relevant Orders    Follow Up In Occupational Therapy    Decreased  strength of left hand - Primary R29.898       Medical Screening:       Reviewed medical history form with patient and medical screening assessed.        Medical History Form scanned into chart    Precautions:         Fall Risk: None         Denies: Cancer and Pacemaker        Past Surgical history: 10/15/24 thumb        Past Medical history: HBP, RA        Pain Assessment:      Pain Assessment: 0-10      Pain (0-10): 5       Pain Location left wrist and hand    OBJECTIVE:  Active range of motion:  left Wrist:  - Flexion: 0  - Extension: 55  - Rd dev: 10  - Ul dev: 26    left Thumb:  - CMC RABD: 55  - MP ext: 30  - MP flex: 48  - IP ext: 20  - IP flex: 54    Strength:  TBA at 8 weeks post op    Outcome Measures:  OT Adult Other Outcome Measures  Other Outcome Measures: Quick Dash 46  (79.55%)      Goals:  Patient will present with a pain score of 1/10 in left wrist and hand.    Patient to increase left wrist flexion and extension AROM to 60 degrees in order to improve independent performance in daily  "activities.     Patient will improve left  strength by 10lb to improve performance in lifting and grasping tasks.     Patient to improve QuickDASH score to at or below 20% to increase independency in ADLs and IADLs.     Patient will report understanding of home program, demonstrate independence and verbalize precautions.     Patient will report follow through with wearing of orthosis as instructed by therapist.    Treatment Performed: (\"NP\" = Not Performed)     Treatment:  Low Complex OT Eval 15 min    Therapeutic Exercise: 15 min  - edema control with tubigrip  - splint wearing schedule and precautions  - wrist AROM all planes  - thumb AROM all planes (per post op protocol)  Therapeutic Activities: NP  -   Manual Therapy:NP  -   Neuromuscular Re-Education: NP  -   Modalities: 10 min  - Fluidotherapy with AROM left hand    Education: Home exercise program instructed and issued.  "

## 2024-11-14 NOTE — PROGRESS NOTES
OCCUPATIONAL THERAPY TREATMENT NOTE    Patient Name:  Adalberto Franklin Jr.   Patient MRN: [id number]  Date: 11/18/2024  Time Calculation  Start Time: 1030  Stop Time: 1112  Time Calculation (min): 42 min    Insurance:  Visit number: 2 of 6  Insurance Type: Payor: ANTHEM / Plan: [employer] [place] / Product Type: *No Product type* /   Authorization or Plan of Care date Range: med necessity   Copay: n/a  Referred by: Jose R Rolle MD        OT Therapeutic Procedures Time Entry  Manual Therapy Time Entry: 17  OT Modalities Time Entry  Ultrasound Time Entry: 10  Whirlpool Time Entry: 15                  General:  Reason for visit: left thumb CMC arthroplasty  Referred by: Dr. Rolle    Type of surgery: Left First Carpometacarpal Arthroplasty With C-arm - Left  Date of surgery: 10/15/2024  Days since surgery: 34    Current Problem:         Problem List Items Addressed This Visit             ICD-10-CM    Primary osteoarthritis of first carpometacarpal joint of left hand M18.12       Assessment:  Progress towards functional goals:  HEP compliance, Improved performance in daily activities , and Reduce swelling  Response to interventions:  Edema and stiffness noted to L thumb. Improved mobility in L thumb MP and IP improved after fluidotherapy and manual therapy. Tolerated all manual therapy. Will introduce new exercises per protocol NV.    Justification for continued skilled care: To address remaining functional, objective and subjective deficits to allow the patient to return to full independence with ADLs. Skilled intervention required to improve ROM which will improve function. Modify and progress home exercise program. Reduce pain to improve function.    Plan:  Progress exercises as tolerated to improve overall UE strength and performance in daily activities , Continued education of techniques such as heat to decrease joint stiffness and muscle tightness., Exercises to gradually increase range of motion., Manual  "therapy to  muscle tightness and improve joint mobility. , and Modalities as needed to address symptoms.    Subjective:  Adalberto reports he feels like his condition is neither improving nor worsening.  Progress towards functional goal:   Patient reports he has been having good and bad days. Notes increased stiffness in the L thumb today.  Pain Location L thumb  Pain (0-10): 5   HEP adherence / understanding: compliance with the instructed home exercises.    Precautions:  Fall Risk: None    Precautions listed: Thumb CMC arthroplasty protocol    Therapy diagnoses:   1. Primary osteoarthritis of first carpometacarpal joint of left hand  Follow Up In Occupational Therapy           Objective: Measured on 24    Treatment Performed: (\"NP\" = Not Performed)     Therapeutic Exercise: NP  -   Therapeutic Activities: NP  -   Manual Therapy: 17 mins  - STM and scar massage to L thenar eminence and surgical scar  - Manual stretches to L wrist (all planes) and thumb MP and IP  Neuromuscular Re-Education: NP  -   Modalities: 25 min  - Fluidotherapy with AROM left hand- 15 mins  - Ultrasound to L thumb 3Mhz, 1.0W/cm2, continuous 10 min       Education: Reviewed home exercise program.  "

## 2024-11-18 ENCOUNTER — TREATMENT (OUTPATIENT)
Dept: OCCUPATIONAL THERAPY | Facility: CLINIC | Age: 54
End: 2024-11-18
Payer: COMMERCIAL

## 2024-11-18 DIAGNOSIS — M18.12 PRIMARY OSTEOARTHRITIS OF FIRST CARPOMETACARPAL JOINT OF LEFT HAND: ICD-10-CM

## 2024-11-18 PROCEDURE — 97022 WHIRLPOOL THERAPY: CPT | Mod: GO

## 2024-11-18 PROCEDURE — 97035 APP MDLTY 1+ULTRASOUND EA 15: CPT | Mod: GO

## 2024-11-18 PROCEDURE — 97140 MANUAL THERAPY 1/> REGIONS: CPT | Mod: GO

## 2024-11-18 ASSESSMENT — PAIN - FUNCTIONAL ASSESSMENT: PAIN_FUNCTIONAL_ASSESSMENT: 0-10

## 2024-11-18 ASSESSMENT — PAIN SCALES - GENERAL: PAINLEVEL_OUTOF10: 5 - MODERATE PAIN

## 2024-11-19 NOTE — PROGRESS NOTES
OCCUPATIONAL THERAPY TREATMENT NOTE    Patient Name:  Adalberto Franklin Jr.   Patient MRN: [id number]  Date: 11/22/2024  Time Calculation  Start Time: 1145  Stop Time: 1228  Time Calculation (min): 43 min    Insurance:  Visit number: 3 of 6  Insurance Type: Payor: ANTHEM / Plan: [employer] [facility] [place] / Product Type: *No Product type* /   Authorization or Plan of Care date Range: med necessity   Copay: n/a  Referred by: Jsoe R Rolle MD        OT Therapeutic Procedures Time Entry  Manual Therapy Time Entry: 18  OT Modalities Time Entry  Ultrasound Time Entry: 10  Whirlpool Time Entry: 15                  General:  Reason for visit: left thumb CMC arthroplasty  Referred by: Dr. Rolle    Type of surgery: Left First Carpometacarpal Arthroplasty With C-arm - Left  Date of surgery: 10/15/2024  Days since surgery: 38    Current Problem:         Problem List Items Addressed This Visit             ICD-10-CM    Primary osteoarthritis of first carpometacarpal joint of left hand M18.12         Assessment:  Progress towards functional goals: Improved mobility in thumb MCPJ  Response to interventions:  Significant swelling noted to 1st web space, 2nd MCPJ, and thenar eminence. Tenderness experienced on dorsal aspect of 1st webspace. Decreased pain and edema noted after IASTM and retrograde massage. Decreased stiffness noted in thumb MPJ during manual stretches. Therapist recommended removing brace when patient is watching TV at night and to wear brace looser to prevent increased swelling. Will transition to new hand-based brace at NV.   Justification for continued skilled care: To address remaining functional, objective and subjective deficits to allow the patient to return to full independence with ADLs. Skilled intervention required to improve ROM which will improve function. Modify and progress home exercise program. Reduce pain to improve function.    Plan:  Progress exercises as tolerated to improve overall UE strength  "and performance in daily activities , Continued education of techniques such as heat to decrease joint stiffness and muscle tightness., Exercises to gradually increase range of motion., Manual therapy to  muscle tightness and improve joint mobility. , and Modalities as needed to address symptoms.  Follow up with Dr. Rolle on 24    Subjective:  Adalberto reports he feels like his condition is neither improving nor worsening.  Progress towards functional goal:   Patient reports he has been experiencing increased pain and throbbing in the L thumb at night. Notes the pain wakes him up. HE is unaware what is causing the pain to increase but believes it could be the brace.   Pain Location L thumb  Pain (0-10): 2   HEP adherence / understanding: compliance with the instructed home exercises.    Precautions:  Fall Risk: None    Precautions listed: Thumb CMC arthroplasty protocol    Therapy diagnoses:   1. Primary osteoarthritis of first carpometacarpal joint of left hand  Follow Up In Occupational Therapy             Objective: Measured on 24    Treatment Performed: (\"NP\" = Not Performed)     Therapeutic Exercise: NP  -   Therapeutic Activities: NP  -   Manual Therapy: 18 mins  - Retrograde massage and IASTM to 1st web space and thumb  - Manual stretches to thumb MP and IP  Neuromuscular Re-Education: NP  -   Modalities: 25 min  - Fluidotherapy with AROM left hand- 15 mins  - Ultrasound to L thumb 3Mhz, 1.0W/cm2, continuous 10 min     Education: Reviewed home exercise program.  "

## 2024-11-22 ENCOUNTER — TREATMENT (OUTPATIENT)
Dept: OCCUPATIONAL THERAPY | Facility: CLINIC | Age: 54
End: 2024-11-22
Payer: COMMERCIAL

## 2024-11-22 DIAGNOSIS — M18.12 PRIMARY OSTEOARTHRITIS OF FIRST CARPOMETACARPAL JOINT OF LEFT HAND: ICD-10-CM

## 2024-11-22 PROCEDURE — 97035 APP MDLTY 1+ULTRASOUND EA 15: CPT | Mod: GO

## 2024-11-22 PROCEDURE — 97140 MANUAL THERAPY 1/> REGIONS: CPT | Mod: GO

## 2024-11-22 PROCEDURE — 97022 WHIRLPOOL THERAPY: CPT | Mod: GO

## 2024-11-22 ASSESSMENT — PAIN SCALES - GENERAL: PAINLEVEL_OUTOF10: 2

## 2024-11-22 ASSESSMENT — PAIN - FUNCTIONAL ASSESSMENT: PAIN_FUNCTIONAL_ASSESSMENT: 0-10

## 2024-11-22 NOTE — PROGRESS NOTES
OCCUPATIONAL THERAPY TREATMENT NOTE    Patient Name:  Adalberto Franklin Jr.   Patient MRN: [id number]  Date: 11/25/2024  Time Calculation  Start Time: 1147  Stop Time: 1227  Time Calculation (min): 40 min    Insurance:  Visit number: 4 of 6  Insurance Type: Payor: ANTHHANSEL / Plan: [employer] [place] / Product Type: *No Product type* /   Authorization or Plan of Care date Range: med necessity   Copay: n/a  Referred by: Jose R Rolle MD        OT Therapeutic Procedures Time Entry  Manual Therapy Time Entry: 15  Therapeutic Exercise Time Entry: 10  OT Modalities Time Entry  Whirlpool Time Entry: 15                  General:  Reason for visit: left thumb CMC arthroplasty  Referred by: Dr. Rolle    Type of surgery: Left First Carpometacarpal Arthroplasty With C-arm - Left  Date of surgery: 10/15/2024  Days since surgery: 41    Current Problem:         Problem List Items Addressed This Visit             ICD-10-CM    Primary osteoarthritis of first carpometacarpal joint of left hand M18.12           Assessment:  Progress towards functional goals: Improved mobility in thumb MCPJ, Improved performance in daily activities , Reduce pain level, and Reduce muscle tightness  Response to interventions:  Provided patient with hand-based thumb spica brace per protocol. Educated patient on wearing schedule and how to properly don and doff. Provided HEP handout with new thumb CMCJ AROM exercises. Patient demonstrates proper form during new exercises. Decreased muscle tightness and edema noted in L thumb after STM. Stiffness noted in CMCJ with manual stretches. Patient notes mild soreness after session.    Justification for continued skilled care: To address remaining functional, objective and subjective deficits to allow the patient to return to full independence with ADLs. Skilled intervention required to improve ROM which will improve function. Modify and progress home exercise program. Reduce pain to improve  "function.    Plan:  Progress exercises as tolerated to improve overall UE strength and performance in daily activities , Therapeutic exercises to strengthen L thumb  for functional use in daily activities. , Continued education of techniques such as heat to decrease joint stiffness and muscle tightness., Exercises to gradually increase range of motion., Manual therapy to  muscle tightness and improve joint mobility. , and Modalities as needed to address symptoms.  Subjective:  Adalberto reports he feels like his condition is neither improving nor worsening.  Progress towards functional goal:   Patient reports he had a follow up with Dr. Rolle earlier today. Dr. Rolle stated the L thumb is healing well and to continue therapy. Patient explains he still has numbness over dorsal 1st webspace and radial fossa. Has not experienced severe pain at night, like last week.   Pain Location L thumb  Pain (0-10): 2   HEP adherence / understanding: compliance with the instructed home exercises.    Precautions:  Fall Risk: None    Precautions listed: Thumb CMC arthroplasty protocol    Therapy diagnoses:   1. Primary osteoarthritis of first carpometacarpal joint of left hand  Follow Up In Occupational Therapy        Objective: Measured on 24    Treatment Performed: (\"NP\" = Not Performed)     Therapeutic Exercise: 10 mins  - Demonstrated thumb CMCJ AROM exercises; HEP handout provided  - Provided patient with a hand-based thumb spica brace. Educated patient on purpose, donning/doffing, wearing schedule (; remove when showering), and proper care of brace.   - Patient demonstrated proper re-call by donning/doffing brace independently. Verbalized good understanding of brace.   - Thumb MPJ, IPJ, and CMCJ AROM exercises     Therapeutic Activities: NP  -   Manual Therapy: 15 mins  - Retrograde massage and IASTM to 1st web space and thumb  - Manual stretches to thumb CMCJ, MP and IP in all planes    Neuromuscular " Re-Education: NP  -   Modalities: 15 min  - Fluidotherapy with AROM left hand- 15 mins    Education: Added thumb CMCJ AROM exercise(s) to HEP program Access Code [email or web address]

## 2024-11-25 ENCOUNTER — OFFICE VISIT (OUTPATIENT)
Dept: ORTHOPEDIC SURGERY | Facility: CLINIC | Age: 54
End: 2024-11-25
Payer: COMMERCIAL

## 2024-11-25 ENCOUNTER — TREATMENT (OUTPATIENT)
Dept: OCCUPATIONAL THERAPY | Facility: CLINIC | Age: 54
End: 2024-11-25
Payer: COMMERCIAL

## 2024-11-25 ENCOUNTER — HOSPITAL ENCOUNTER (OUTPATIENT)
Dept: RADIOLOGY | Facility: CLINIC | Age: 54
Discharge: HOME | End: 2024-11-25
Payer: COMMERCIAL

## 2024-11-25 DIAGNOSIS — M18.12 PRIMARY OSTEOARTHRITIS OF FIRST CARPOMETACARPAL JOINT OF LEFT HAND: Primary | ICD-10-CM

## 2024-11-25 DIAGNOSIS — M18.12 PRIMARY OSTEOARTHRITIS OF FIRST CARPOMETACARPAL JOINT OF LEFT HAND: ICD-10-CM

## 2024-11-25 PROCEDURE — 97110 THERAPEUTIC EXERCISES: CPT | Mod: GO

## 2024-11-25 PROCEDURE — 99211 OFF/OP EST MAY X REQ PHY/QHP: CPT | Performed by: ORTHOPAEDIC SURGERY

## 2024-11-25 PROCEDURE — 73140 X-RAY EXAM OF FINGER(S): CPT | Mod: LEFT SIDE | Performed by: RADIOLOGY

## 2024-11-25 PROCEDURE — 97022 WHIRLPOOL THERAPY: CPT | Mod: GO

## 2024-11-25 PROCEDURE — 73140 X-RAY EXAM OF FINGER(S): CPT | Mod: LT

## 2024-11-25 PROCEDURE — 97140 MANUAL THERAPY 1/> REGIONS: CPT | Mod: GO

## 2024-11-25 NOTE — PROGRESS NOTES
Subjective    Patient ID: Adalberto Franklin Jr. is a 54 y.o. male.    Chief Complaint: OTHER (F/U LEFT FIRST CARPOMETACARPAL ARTHROPLASTY/DOS: 10/15/24/))     Last Surgery: Left First Carpometacarpal Arthroplasty With C-arm - Left  Last Surgery Date: 10/15/2024    HPI  The patient comes in for follow-up of his left first CMC arthroplasty.  He is about 6 weeks out from surgery.  He still notices numbness near the incision site.  He has been progressing well with therapy.    Objective   Ortho Exam  The patient is in no acute distress.  Exam of his left thumb reveals he still has moderate swelling.  His incision is well-healed.  Sensation is intact but altered dorsal to the incision site.  He has appropriate function of his EPL and FPL.  He can oppose his thumb to the fourth MCP joint volarly.    Image Results:  X-rays of his left thumb were personally reviewed.  They show evidence of the trapezium excision.  There has been no subsidence of the first metacarpal.    Assessment/Plan   Encounter Diagnoses:  Primary osteoarthritis of first carpometacarpal joint of left hand    Orders Placed This Encounter    XR fingers left 2+ views     The patient is slowly improving following his left first CMC arthroplasty.  He may slowly wean out of his thumb spica brace.  He will continue with therapy for further range of motion and strengthening.  He will follow-up in 4 weeks with x-rays of his left thumb.

## 2024-11-25 NOTE — PROGRESS NOTES
OCCUPATIONAL THERAPY TREATMENT NOTE    Patient Name:  Adalberto Franklin Jr.   Patient MRN: [id number]  Date: 11/27/2024  Time Calculation  Start Time: 1100  Stop Time: 1145  Time Calculation (min): 45 min    Insurance:  Visit number: 5 of 6  Insurance Type: Payor: ANTHEM / Plan: [employer] [facility] [place] / Product Type: *No Product type* /   Authorization or Plan of Care date Range: med necessity   Copay: n/a  Referred by: Jose R Rolle MD        OT Therapeutic Procedures Time Entry  Manual Therapy Time Entry: 15  Therapeutic Exercise Time Entry: 15  OT Modalities Time Entry  Whirlpool Time Entry: 15                  General:  Reason for visit: left thumb CMC arthroplasty  Referred by: Dr. Rolle    Type of surgery: Left First Carpometacarpal Arthroplasty With C-arm - Left  Date of surgery: 10/15/2024  Days since surgery: 43    Current Problem:         Problem List Items Addressed This Visit             ICD-10-CM    Primary osteoarthritis of first carpometacarpal joint of left hand M18.12             Assessment:  Progress towards functional goals: Improved mobility in thumb MCPJ, Improved performance in daily activities , Reduce pain level, and Reduce muscle tightness  Response to interventions:  Patient was able to demonstrate good technique with passive wrist stretches for HEP.  Adalberto was challenged with MRM disc activity as tremoring was present in his left hand due to weakness and fatigue.    Justification for continued skilled care: To address remaining functional, objective and subjective deficits to allow the patient to return to full independence with ADLs. Skilled intervention required to improve ROM which will improve function. Modify and progress home exercise program. Reduce pain to improve function.    Plan:  Progress exercises as tolerated to improve overall UE strength and performance in daily activities , Therapeutic exercises to strengthen L thumb  for functional use in daily activities. ,  "Continued education of techniques such as heat to decrease joint stiffness and muscle tightness., Exercises to gradually increase range of motion., Manual therapy to  muscle tightness and improve joint mobility. , and Modalities as needed to address symptoms.    Subjective:  Adalberto reports he feels like his condition is neither improving nor worsening.  Progress towards functional goal:   Patient reports he was very sore after completing updated HEP last visit.   Pain Location L thumb  Pain (0-10): 2   HEP adherence / understanding: compliance with the instructed home exercises.    Precautions:  Fall Risk: None    Precautions listed: Thumb CMC arthroplasty protocol    Therapy diagnoses:   1. Primary osteoarthritis of first carpometacarpal joint of left hand  Follow Up In Occupational Therapy          Objective: Measured on 24    Treatment Performed: (\"NP\" = Not Performed)     Therapeutic Exercise: 15 mins  Prayer stretches for wrist extension and passive flexion self stretch  MRM stacking, turning and in hand manipulation  BTE no resistance: tool 601 2 min, tool 504 2 min  Therapeutic Activities: NP  -   Manual Therapy: 15 mins  - Manual stretches to thumb CMCJ, MP and IP in all planes  - joint traction and stretches to the wrist and forearm in all planes.    Neuromuscular Re-Education: NP  -   Modalities: 15 min  - Fluidotherapy with AROM left hand- 15 mins    Education: Reviewed home exercise program.    "

## 2024-11-27 ENCOUNTER — TREATMENT (OUTPATIENT)
Dept: OCCUPATIONAL THERAPY | Facility: CLINIC | Age: 54
End: 2024-11-27
Payer: COMMERCIAL

## 2024-11-27 DIAGNOSIS — M18.12 PRIMARY OSTEOARTHRITIS OF FIRST CARPOMETACARPAL JOINT OF LEFT HAND: ICD-10-CM

## 2024-11-27 PROCEDURE — 97022 WHIRLPOOL THERAPY: CPT | Mod: GO | Performed by: OCCUPATIONAL THERAPIST

## 2024-11-27 PROCEDURE — 97140 MANUAL THERAPY 1/> REGIONS: CPT | Mod: GO | Performed by: OCCUPATIONAL THERAPIST

## 2024-11-27 PROCEDURE — 97110 THERAPEUTIC EXERCISES: CPT | Mod: GO | Performed by: OCCUPATIONAL THERAPIST

## 2024-12-02 ENCOUNTER — APPOINTMENT (OUTPATIENT)
Dept: OCCUPATIONAL THERAPY | Facility: CLINIC | Age: 54
End: 2024-12-02
Payer: COMMERCIAL

## 2024-12-02 ENCOUNTER — DOCUMENTATION (OUTPATIENT)
Dept: OCCUPATIONAL THERAPY | Facility: CLINIC | Age: 54
End: 2024-12-02
Payer: COMMERCIAL

## 2024-12-02 DIAGNOSIS — R29.898 DECREASED GRIP STRENGTH OF LEFT HAND: Primary | ICD-10-CM

## 2024-12-02 NOTE — PROGRESS NOTES
Occupational Therapy                 Therapy Communication Note    Patient Name: Adalberto Franklin JrWayne  MRN: [id number]    Today's Date: 12/2/2024     Discipline: Occupational Therapy    Missed Visit Reason:  Illness    Missed Time: Cancel

## 2024-12-02 NOTE — PROGRESS NOTES
OCCUPATIONAL THERAPY TREATMENT NOTE    Patient Name:  Adalberto Franklin Jr.   Patient MRN: [id number]  Date: 12/4/2024  Time Calculation  Start Time: 1115  Stop Time: 1155  Time Calculation (min): 40 min    Insurance:  Visit number: 6   Insurance Type: Payor: ANTHHANSEL / Plan: [place]SEL [place] / Product Type: *No Product type* /   Authorization or Plan of Care date Range: med necessity   Copay: n/a  Referred by: Jose R Rolle MD        OT Therapeutic Procedures Time Entry  Manual Therapy Time Entry: 10  Therapeutic Activity Time Entry: 15  OT Modalities Time Entry  Whirlpool Time Entry: 15                  General:  Reason for visit: left thumb CMC arthroplasty  Referred by: Dr. Rolle    Type of surgery: Left First Carpometacarpal Arthroplasty With C-arm - Left  Date of surgery: 10/15/2024  Days since surgery: 50    Current Problem:         Problem List Items Addressed This Visit             ICD-10-CM    Primary osteoarthritis of first carpometacarpal joint of left hand M18.12               Assessment:  Progress towards functional goals: Improved mobility in thumb MCPJ, Improved performance in daily activities , Reduce pain level, and Reduce muscle tightness  Response to interventions:  Patient demonstrated weakness and muscle tremoring with small tip pinch activities on [employer] Pegboard.  He was also challenged with overhead manipulation on valpar task this visit.    Justification for continued skilled care: To address remaining functional, objective and subjective deficits to allow the patient to return to full independence with ADLs. Skilled intervention required to improve ROM which will improve function. Modify and progress home exercise program. Reduce pain to improve function.    Plan:  Progress exercises as tolerated to improve overall UE strength and performance in daily activities , Therapeutic exercises to strengthen L thumb  for functional use in daily activities. , Continued education of techniques  "such as heat to decrease joint stiffness and muscle tightness., Exercises to gradually increase range of motion., Manual therapy to  muscle tightness and improve joint mobility. , and Modalities as needed to address symptoms.    Subjective:  Adalberto reports he feels like his condition is neither improving nor worsening.  Progress towards functional goal:   Patient reports still difficulty with buttons, zippers, and pulling objects.   Pain Location L thumb base  Pain (0-10): 5    HEP adherence / understanding: compliance with the instructed home exercises.    Precautions:  Fall Risk: None    Precautions listed: Thumb CMC arthroplasty protocol    Therapy diagnoses:   1. Primary osteoarthritis of first carpometacarpal joint of left hand  Follow Up In Occupational Therapy          Objective: Measured on 24    Treatment Performed: (\"NP\" = Not Performed)     Therapeutic Exercise: NP  Therapeutic Activities: 15 mins  - - [employer] Pegboard assembly  - standing reach and peg manipulation on valpar panel 1  Manual Therapy: 10 mins  - Manual stretches to thumb CMCJ, MP and IP in all planes  - joint traction and stretches to the wrist and forearm in all planes.    Neuromuscular Re-Education: NP  -   Modalities: 15 min  - Fluidotherapy with AROM left hand- 15 mins    Education: Reviewed home exercise program.    "

## 2024-12-02 NOTE — PROGRESS NOTES
OCCUPATIONAL THERAPY TREATMENT NOTE    Patient Name:  Adalberto Franklin Jr.   Patient MRN: [id number]  Date: 2024       Insurance:  Visit number:   Insurance Type: Payor: IRON / Plan: IRON [place] / Product Type: *No Product type* /   Authorization or Plan of Care date Range: med necessity   Copay: n/a  Referred by: Jose R Rolle MD                              General:  Reason for visit: left thumb CMC arthroplasty  Referred by: Dr. Rolle    Type of surgery: Left First Carpometacarpal Arthroplasty With C-arm - Left  Date of surgery: 10/15/2024  Days since surgery: 48    Current Problem:         Problem List Items Addressed This Visit    None              Assessment:  Progress towards functional goals: Improved mobility in thumb MCPJ, Improved performance in daily activities , Reduce pain level, and Reduce muscle tightness  Response to interventions:  Patient was able to demonstrate good technique with passive wrist stretches for HEP.  Adalberto was challenged with MRM disc activity as tremoring was present in his left hand due to weakness and fatigue.    Justification for continued skilled care: To address remaining functional, objective and subjective deficits to allow the patient to return to full independence with ADLs. Skilled intervention required to improve ROM which will improve function. Modify and progress home exercise program. Reduce pain to improve function.    Plan:  Progress exercises as tolerated to improve overall UE strength and performance in daily activities , Therapeutic exercises to strengthen L thumb  for functional use in daily activities. , Continued education of techniques such as heat to decrease joint stiffness and muscle tightness., Exercises to gradually increase range of motion., Manual therapy to  muscle tightness and improve joint mobility. , and Modalities as needed to address symptoms.    Subjective:  Adalberto reports he feels like his condition is  "neither improving nor worsening.  Progress towards functional goal:   Patient reports he was very sore after completing updated HEP last visit.   Pain Location L thumb  Pain (0-10): 2   HEP adherence / understanding: compliance with the instructed home exercises.    Precautions:  Fall Risk: None    Precautions listed: Thumb CMC arthroplasty protocol    Therapy diagnoses:   No diagnosis found.      Objective: Measured on 11/11/24    Treatment Performed: (\"NP\" = Not Performed)     Therapeutic Exercise: 15 mins  Prayer stretches for wrist extension and passive flexion self stretch  MRM stacking, turning and in hand manipulation  BTE no resistance: tool 601 2 min, tool 504 2 min  Therapeutic Activities: NP  -   Manual Therapy: 15 mins  - Manual stretches to thumb CMCJ, MP and IP in all planes  - joint traction and stretches to the wrist and forearm in all planes.    Neuromuscular Re-Education: NP  -   Modalities: 15 min  - Fluidotherapy with AROM left hand- 15 mins    Education: Reviewed home exercise program.    "

## 2024-12-04 ENCOUNTER — TREATMENT (OUTPATIENT)
Dept: OCCUPATIONAL THERAPY | Facility: CLINIC | Age: 54
End: 2024-12-04
Payer: COMMERCIAL

## 2024-12-04 DIAGNOSIS — M18.12 PRIMARY OSTEOARTHRITIS OF FIRST CARPOMETACARPAL JOINT OF LEFT HAND: ICD-10-CM

## 2024-12-04 PROCEDURE — 97022 WHIRLPOOL THERAPY: CPT | Mod: GO | Performed by: OCCUPATIONAL THERAPIST

## 2024-12-04 PROCEDURE — 97140 MANUAL THERAPY 1/> REGIONS: CPT | Mod: GO | Performed by: OCCUPATIONAL THERAPIST

## 2024-12-04 PROCEDURE — 97530 THERAPEUTIC ACTIVITIES: CPT | Mod: GO | Performed by: OCCUPATIONAL THERAPIST

## 2024-12-05 NOTE — PROGRESS NOTES
OCCUPATIONAL THERAPY TREATMENT NOTE    Patient Name:  Adalberto Franklin Jr.   Patient MRN: [id number]  Date: 12/9/2024  Time Calculation  Start Time: 1130  Stop Time: 1212  Time Calculation (min): 42 min    Insurance:  Visit number: 7   Insurance Type: Payor: ANTHHANSEL / Plan: [employer]SEL [place] / Product Type: *No Product type* /   Authorization or Plan of Care date Range: med necessity   Copay: n/a  Referred by: Jose R Rolle MD        OT Therapeutic Procedures Time Entry  Manual Therapy Time Entry: 17  OT Modalities Time Entry  Ultrasound Time Entry: 10  Whirlpool Time Entry: 15                  General:  Reason for visit: left thumb CMC arthroplasty  Referred by: Dr. Rolle    Type of surgery: Left First Carpometacarpal Arthroplasty With C-arm - Left  Date of surgery: 10/15/2024  Days since surgery: 55    Current Problem:         Problem List Items Addressed This Visit             ICD-10-CM    Primary osteoarthritis of first carpometacarpal joint of left hand M18.12     Assessment:  Progress towards functional goals: Improved mobility in thumb MCPJ and Improved performance in daily activities   Response to interventions:  Modalities and STM performed to reduce swelling and sharp pains. Sensitivity noted over surgical scar during STM. Normal mobility achieved in L thumb and wrist during manual stretches. Patient notes pain and sensitivity over carpal tunnel during STM. Therapist recommended performing wrist stretches to reduce s/s of carpal tunnel and muscle tightness along anterior wrist. Will introduced theraputty activities NV if pain symptoms do not increase.    Justification for continued skilled care: To address remaining functional, objective and subjective deficits to allow the patient to return to full independence with ADLs. Skilled intervention required to improve ROM which will improve function. Modify and progress home exercise program. Reduce pain to improve function.    Plan:  Progress exercises as  "tolerated to improve overall UE strength and performance in daily activities , Therapeutic exercises to strengthen L thumb  for functional use in daily activities. , Continued education of techniques such as heat to decrease joint stiffness and muscle tightness., Exercises to gradually increase range of motion., Manual therapy to  muscle tightness and improve joint mobility. , and Modalities as needed to address symptoms.  Introduced theraputty activities    Subjective:  Adalberto reports he feels like his condition is worsening.  Progress towards functional goal:   Patient reports he experienced increased sharp pain along medial forearm, thumb, and dorsal aspect of hand, last night. Rated the pain a 12/10. Patient took a pain pill for relief and notes it helped manage the pain. He is unsure what is causing this pain. Continues to wear his hand base thumb CMC brace. Continues to have sharp pains in the L forearm and hand today.    Pain Location L thumb base  Pain (0-10): 6    HEP adherence / understanding: compliance with the instructed home exercises.    Precautions:  Fall Risk: None    Precautions listed: Thumb CMC arthroplasty protocol    Therapy diagnoses:   1. Primary osteoarthritis of first carpometacarpal joint of left hand  Follow Up In Occupational Therapy        Objective: Measured on 24    Treatment Performed: (\"NP\" = Not Performed)     Therapeutic Exercise: NP    Therapeutic Activities: NP    Manual Therapy: 17 mins  - Manual stretches to wrist and  thumb CMCJ, MP and IP in all planes  - joint traction and stretches to the wrist and forearm in all planes.    Neuromuscular Re-Education: NP  -   Modalities: 25 min  - Fluidotherapy with AROM left hand- 15 mins  - Ultrasound to dorsal hand and L thumb 3Mhz, 1.0W/cm2, continuous 10 min     Education: Reviewed home exercise program.    "

## 2024-12-09 ENCOUNTER — TREATMENT (OUTPATIENT)
Dept: OCCUPATIONAL THERAPY | Facility: CLINIC | Age: 54
End: 2024-12-09
Payer: COMMERCIAL

## 2024-12-09 DIAGNOSIS — M18.12 PRIMARY OSTEOARTHRITIS OF FIRST CARPOMETACARPAL JOINT OF LEFT HAND: ICD-10-CM

## 2024-12-09 PROCEDURE — 97022 WHIRLPOOL THERAPY: CPT | Mod: GO

## 2024-12-09 PROCEDURE — 97035 APP MDLTY 1+ULTRASOUND EA 15: CPT | Mod: GO

## 2024-12-09 PROCEDURE — 97140 MANUAL THERAPY 1/> REGIONS: CPT | Mod: GO

## 2024-12-11 ENCOUNTER — TREATMENT (OUTPATIENT)
Dept: OCCUPATIONAL THERAPY | Facility: CLINIC | Age: 54
End: 2024-12-11
Payer: COMMERCIAL

## 2024-12-11 DIAGNOSIS — M18.12 PRIMARY OSTEOARTHRITIS OF FIRST CARPOMETACARPAL JOINT OF LEFT HAND: ICD-10-CM

## 2024-12-11 PROCEDURE — 97022 WHIRLPOOL THERAPY: CPT | Mod: GO | Performed by: OCCUPATIONAL THERAPIST

## 2024-12-11 PROCEDURE — 97140 MANUAL THERAPY 1/> REGIONS: CPT | Mod: GO | Performed by: OCCUPATIONAL THERAPIST

## 2024-12-11 PROCEDURE — 97530 THERAPEUTIC ACTIVITIES: CPT | Mod: GO | Performed by: OCCUPATIONAL THERAPIST

## 2024-12-11 NOTE — PROGRESS NOTES
OCCUPATIONAL THERAPY TREATMENT NOTE    Patient Name:  Adalberto Franklin Jr.   Patient MRN: [id number]  Date: 2024  Time Calculation  Start Time: 1145  Stop Time: 1230  Time Calculation (min): 45 min    Insurance:  Visit number: 8   Insurance Type: Payor: ANTHEM / Plan: ANTHEM [place] / Product Type: *No Product type* /   Authorization or Plan of Care date Range: med necessity   Copay: n/a  Referred by: Jose R Rolle MD        OT Therapeutic Procedures Time Entry  Manual Therapy Time Entry: 10  Therapeutic Activity Time Entry: 20  OT Modalities Time Entry  Whirlpool Time Entry: 15                  General:  Reason for visit: left thumb CMC arthroplasty  Referred by: Dr. Rolle    Type of surgery: Left First Carpometacarpal Arthroplasty With C-arm - Left  Date of surgery: 10/15/2024  Days since surgery: 57    Current Problem:         Problem List Items Addressed This Visit             ICD-10-CM    Primary osteoarthritis of first carpometacarpal joint of left hand M18.12     Assessment:  Progress towards functional goals: Improved mobility in thumb MCPJ and Improved performance in daily activities   Response to interventions:      Justification for continued skilled care: To address remaining functional, objective and subjective deficits to allow the patient to return to full independence with ADLs. Skilled intervention required to improve ROM which will improve function. Modify and progress home exercise program. Reduce pain to improve function.    Plan:  Progress exercises as tolerated to improve overall UE strength and performance in daily activities , Therapeutic exercises to strengthen L thumb  for functional use in daily activities. , Continued education of techniques such as heat to decrease joint stiffness and muscle tightness., Exercises to gradually increase range of motion., Manual therapy to  muscle tightness and improve joint mobility. , and Modalities as needed to address  "symptoms.  Introduced theraputty activities    Subjective:  Adalberto reports he feels like his condition is worsening.  Progress towards functional goal:   Patient reports \"I don't trust this hand yet\" and continues to limit lifting and household chores with left hand..    Pain Location L thumb base  Pain (0-10): 6    HEP adherence / understanding: compliance with the instructed home exercises.    Precautions:  Fall Risk: None    Precautions listed: Thumb CMC arthroplasty protocol    Therapy diagnoses:   1. Primary osteoarthritis of first carpometacarpal joint of left hand  Follow Up In Occupational Therapy        Objective: Measured on 11/11/24    Treatment Performed: (\"NP\" = Not Performed)     Therapeutic Exercise: NP    Therapeutic Activities: 20 min  - Odell Amorelieezer activity  - cylinder lift 2#, 5# waist to shoulder to overhead   2 min 3T   2 min 3T   2 min 1T   2 min 4T  Manual Therapy: 10 mins  - Manual stretches to wrist and  thumb CMCJ, MP and IP in all planes  - joint traction and stretches to the wrist and forearm in all planes.    Neuromuscular Re-Education: NP  -   Modalities: 15 min  - Fluidotherapy with AROM left hand- 15 mins      Education: Reviewed home exercise program.    "

## 2024-12-16 ENCOUNTER — APPOINTMENT (OUTPATIENT)
Dept: OCCUPATIONAL THERAPY | Facility: CLINIC | Age: 54
End: 2024-12-16
Payer: COMMERCIAL

## 2024-12-17 NOTE — PROGRESS NOTES
OCCUPATIONAL THERAPY TREATMENT NOTE    Patient Name:  Adalberto Franklin Jr.   Patient MRN: [id number]  Date: 12/18/2024  Time Calculation  Start Time: 1102  Stop Time: 1142  Time Calculation (min): 40 min    Insurance:  Visit number: 9  Insurance Type: Payor: ANTHEM / Plan: ANTHH[employer]SEL [place] / Product Type: *No Product type* /   Authorization or Plan of Care date Range: med necessity   Copay: n/a  Referred by: Jose R Rolle MD        OT Therapeutic Procedures Time Entry  Manual Therapy Time Entry: 5  Therapeutic Activity Time Entry: 10  OT Modalities Time Entry  Ultrasound Time Entry: 10  Whirlpool Time Entry: 15                  General:  Reason for visit: left thumb CMC arthroplasty  Referred by: Dr. Rolle    Type of surgery: Left First Carpometacarpal Arthroplasty With C-arm - Left  Date of surgery: 10/15/2024  Days since surgery: 64    Current Problem:         Problem List Items Addressed This Visit             ICD-10-CM    Primary osteoarthritis of first carpometacarpal joint of left hand M18.12       Assessment:  Progress towards functional goals: Improved mobility in thumb MCPJ and Improved performance in daily activities   Response to interventions:   OT resumed US this visit to address visible swelling in the first dorsal compartment and webspace.  Adalberto was able to complete full BTE program with no increase in pain or additional symptoms.  Continue to progress with strengthening as tolerated.   Justification for continued skilled care: To address remaining functional, objective and subjective deficits to allow the patient to return to full independence with ADLs. Skilled intervention required to improve ROM which will improve function. Modify and progress home exercise program. Reduce pain to improve function.    Plan:  Progress exercises as tolerated to improve overall UE strength and performance in daily activities , Therapeutic exercises to strengthen L thumb  for functional use in daily  "activities. , Continued education of techniques such as heat to decrease joint stiffness and muscle tightness., Exercises to gradually increase range of motion., Manual therapy to  muscle tightness and improve joint mobility. , and Modalities as needed to address symptoms.    Subjective:  Adalberto reports he feels like his condition is worsening.  Progress towards functional goal:   Patient reports he has not had the wrist wrap on for the past 2 weeks.  He was also able to use clippers to trim his nails for the first time since surgery.  Patient is concerned with swelling in his left hand as he is doing more at home and in the clinic.  Pain Location L thumb base  Pain (0-10): 0    HEP adherence / understanding: compliance with the instructed home exercises.    Precautions:  Fall Risk: None    Precautions listed: Thumb CMC arthroplasty protocol    Therapy diagnoses:   1. Primary osteoarthritis of first carpometacarpal joint of left hand  Follow Up In Occupational Therapy          Objective: Measured on 24    Treatment Performed: (\"NP\" = Not Performed)     Therapeutic Exercise: NP    Therapeutic Activities: 10 min   2 min 3T   2 min 3T   2 min 1T   2 min 4T   2 min 5t    Manual Therapy: 5 mins  - Manual stretches to wrist and  thumb CMCJ, MP and IP in all planes  - joint traction and stretches to the wrist and forearm in all planes.    Neuromuscular Re-Education: NP  -   Modalities: 25 min  - Fluidotherapy with AROM left hand- 15 mins  - US to first dorsal compartment, webspace 3Mhz, 1.0w/cm2, continuous      Education: Reviewed home exercise program.    "

## 2024-12-18 ENCOUNTER — TREATMENT (OUTPATIENT)
Dept: OCCUPATIONAL THERAPY | Facility: CLINIC | Age: 54
End: 2024-12-18
Payer: COMMERCIAL

## 2024-12-18 DIAGNOSIS — M18.12 PRIMARY OSTEOARTHRITIS OF FIRST CARPOMETACARPAL JOINT OF LEFT HAND: ICD-10-CM

## 2024-12-18 PROCEDURE — 97022 WHIRLPOOL THERAPY: CPT | Mod: GO | Performed by: OCCUPATIONAL THERAPIST

## 2024-12-18 PROCEDURE — 97530 THERAPEUTIC ACTIVITIES: CPT | Mod: GO | Performed by: OCCUPATIONAL THERAPIST

## 2024-12-18 PROCEDURE — 97035 APP MDLTY 1+ULTRASOUND EA 15: CPT | Mod: GO | Performed by: OCCUPATIONAL THERAPIST

## 2024-12-23 ENCOUNTER — OFFICE VISIT (OUTPATIENT)
Dept: ORTHOPEDIC SURGERY | Facility: CLINIC | Age: 54
End: 2024-12-23
Payer: COMMERCIAL

## 2024-12-23 ENCOUNTER — HOSPITAL ENCOUNTER (OUTPATIENT)
Dept: RADIOLOGY | Facility: CLINIC | Age: 54
Discharge: HOME | End: 2024-12-23
Payer: COMMERCIAL

## 2024-12-23 ENCOUNTER — TREATMENT (OUTPATIENT)
Dept: OCCUPATIONAL THERAPY | Facility: CLINIC | Age: 54
End: 2024-12-23
Payer: COMMERCIAL

## 2024-12-23 VITALS — BODY MASS INDEX: 31.36 KG/M2 | WEIGHT: 224 LBS | HEIGHT: 71 IN

## 2024-12-23 DIAGNOSIS — M18.12 PRIMARY OSTEOARTHRITIS OF FIRST CARPOMETACARPAL JOINT OF LEFT HAND: ICD-10-CM

## 2024-12-23 DIAGNOSIS — M18.12 PRIMARY OSTEOARTHRITIS OF FIRST CARPOMETACARPAL JOINT OF LEFT HAND: Primary | ICD-10-CM

## 2024-12-23 PROCEDURE — 1036F TOBACCO NON-USER: CPT | Performed by: ORTHOPAEDIC SURGERY

## 2024-12-23 PROCEDURE — 97530 THERAPEUTIC ACTIVITIES: CPT | Mod: GO | Performed by: OCCUPATIONAL THERAPIST

## 2024-12-23 PROCEDURE — 73140 X-RAY EXAM OF FINGER(S): CPT | Mod: LT

## 2024-12-23 PROCEDURE — 73140 X-RAY EXAM OF FINGER(S): CPT | Mod: LEFT SIDE | Performed by: STUDENT IN AN ORGANIZED HEALTH CARE EDUCATION/TRAINING PROGRAM

## 2024-12-23 PROCEDURE — 3008F BODY MASS INDEX DOCD: CPT | Performed by: ORTHOPAEDIC SURGERY

## 2024-12-23 PROCEDURE — 97022 WHIRLPOOL THERAPY: CPT | Mod: GO | Performed by: OCCUPATIONAL THERAPIST

## 2024-12-23 PROCEDURE — 99211 OFF/OP EST MAY X REQ PHY/QHP: CPT | Performed by: ORTHOPAEDIC SURGERY

## 2024-12-23 NOTE — LETTER
December 23, 2024     Patient: Adalberto Franklin Jr.   YOB: 1970   Date of Visit: 12/23/2024       To Whom It May Concern:    It is my medical opinion that Adalberto Franklin may return to work on February 4, 2025 with no restrictions .    If you have any questions or concerns, please don't hesitate to call.         Sincerely,        Jose R Rolle MD    CC: No Recipients

## 2024-12-23 NOTE — PROGRESS NOTES
Subjective    Patient ID: Adalberto Franklin Jr. is a 54 y.o. male.    Chief Complaint: OTHER (LEFT FIRST CARPOMETACARPAL ARTHROPLASTY WITH C-ARM/DOS 10/15/24)     Last Surgery: Left First Carpometacarpal Arthroplasty With C-arm - Left  Last Surgery Date: 10/15/2024    HPI  The patient comes in for follow-up of his left first CMC arthroplasty.  He is about 11 weeks out from surgery.  He is progressing with therapy.  He still notices pain with activities that require more strength.  Objective   Ortho Exam  Patient is in no acute distress.  Exam of his left hand and wrist reveals his incision is well-healed.  There is no evidence of infection.  He can oppose his left thumb to the fourth MCP joint.  He has appropriate function of his EPL and FPL.    Image Results:  X-rays of his left thumb were personally reviewed today.  They show evidence of the trapezium excision.  There has been no subsidence of the first metacarpal.    Assessment/Plan   Encounter Diagnoses:  Primary osteoarthritis of first carpometacarpal joint of left hand    Orders Placed This Encounter    XR fingers left 2+ views     Patient is doing satisfactory following his left first CMC arthroplasty.  He will continue with therapy for strengthening.  He will follow-up in 3 to 4 weeks with x-rays of his left thumb.

## 2024-12-23 NOTE — PROGRESS NOTES
OCCUPATIONAL THERAPY TREATMENT NOTE    Patient Name:  Adalberto Franklin Jr.   Patient MRN: [id number]  Date: 12/23/2024  Time Calculation  Start Time: 1203  Stop Time: 1235  Time Calculation (min): 32 min    Insurance:  Visit number: 10  Insurance Type: Payor: ANTHEM / Plan: [employer]SEL [place] / Product Type: *No Product type* /   Authorization or Plan of Care date Range: med necessity   Copay: n/a  Referred by: Jose R Rolle MD        OT Therapeutic Procedures Time Entry  Therapeutic Activity Time Entry: 12  OT Modalities Time Entry  Ultrasound Time Entry: 10  Whirlpool Time Entry: 10                  General:  Reason for visit: left thumb CMC arthroplasty  Referred by: Dr. Rolle    Type of surgery: Left First Carpometacarpal Arthroplasty With C-arm - Left  Date of surgery: 10/15/2024  Days since surgery: 69    Current Problem:         Problem List Items Addressed This Visit             ICD-10-CM    Primary osteoarthritis of first carpometacarpal joint of left hand M18.12    Relevant Orders    Follow Up In Occupational Therapy         Assessment:  Progress towards functional goals: Improved mobility in thumb MCPJ and Improved performance in daily activities   Response to interventions:   Patient completed BTE program this visit with increased pain in his thumb CMC joint and required increased rest between each tool.  Muscle tremoring present with US after therapeutic activities were completed.   Justification for continued skilled care: To address remaining functional, objective and subjective deficits to allow the patient to return to full independence with ADLs. Skilled intervention required to improve ROM which will improve function. Modify and progress home exercise program. Reduce pain to improve function.    Plan:  Progress exercises as tolerated to improve overall UE strength and performance in daily activities , Therapeutic exercises to strengthen L thumb  for functional use in daily activities. ,  "Continued education of techniques such as heat to decrease joint stiffness and muscle tightness., Exercises to gradually increase range of motion., Manual therapy to  muscle tightness and improve joint mobility. , and Modalities as needed to address symptoms.    Subjective:  Adalberto reports he feels like his condition is worsening.  Progress towards functional goal:   Patient was 15 min late to appt as he was stuck in the orthopedic office.  Physician was pleased with xray and progress in OT.  They agreed upon RTW date of 25.   Pain Location L thumb base  Pain (0-10): 0    HEP adherence / understanding: compliance with the instructed home exercises.    Precautions:  Fall Risk: None    Precautions listed: Thumb CMC arthroplasty protocol    Therapy diagnoses:   1. Primary osteoarthritis of first carpometacarpal joint of left hand  Follow Up In Occupational Therapy    Follow Up In Occupational Therapy            Objective: Measured on 24    Treatment Performed: (\"NP\" = Not Performed)     Therapeutic Exercise: NP    Therapeutic Activities: 12 min   2 min 3T   2 min 3T   2 min 1T   2 min 4T   2 min 5T    Manual Therapy: NP  Neuromuscular Re-Education: NP  -   Modalities: 20 min  - Fluidotherapy with AROM left hand- 10 mins  - US to first dorsal compartment, webspace 3Mhz, 1.0w/cm2, continuous 10 min      Education: Reviewed home exercise program.    "

## 2024-12-26 ENCOUNTER — TREATMENT (OUTPATIENT)
Dept: OCCUPATIONAL THERAPY | Facility: CLINIC | Age: 54
End: 2024-12-26
Payer: COMMERCIAL

## 2024-12-26 DIAGNOSIS — M18.12 PRIMARY OSTEOARTHRITIS OF FIRST CARPOMETACARPAL JOINT OF LEFT HAND: ICD-10-CM

## 2024-12-26 PROCEDURE — 97530 THERAPEUTIC ACTIVITIES: CPT | Mod: GO | Performed by: OCCUPATIONAL THERAPIST

## 2024-12-26 PROCEDURE — 97022 WHIRLPOOL THERAPY: CPT | Mod: GO | Performed by: OCCUPATIONAL THERAPIST

## 2024-12-26 PROCEDURE — 97035 APP MDLTY 1+ULTRASOUND EA 15: CPT | Mod: GO | Performed by: OCCUPATIONAL THERAPIST

## 2024-12-26 NOTE — PROGRESS NOTES
OCCUPATIONAL THERAPY TREATMENT NOTE    Patient Name:  Adalberto Franklin Jr.   Patient MRN: [id number]  Date: 12/26/2024  Time Calculation  Start Time: 1130  Stop Time: 1208  Time Calculation (min): 38 min    Insurance:  Visit number: 10  Insurance Type: Payor: IRON / Plan: [place] [employer] [place] / Product Type: *No Product type* /   Authorization or Plan of Care date Range: med necessity   Copay: n/a  Referred by: Jose R Rolle MD        OT Therapeutic Procedures Time Entry  Manual Therapy Time Entry: 5  Therapeutic Activity Time Entry: 13  OT Modalities Time Entry  Ultrasound Time Entry: 10  Whirlpool Time Entry: 10                  General:  Reason for visit: left thumb CMC arthroplasty  Referred by: Dr. Rolle    Type of surgery: Left First Carpometacarpal Arthroplasty With C-arm - Left  Date of surgery: 10/15/2024  Days since surgery: 72    Current Problem:         Problem List Items Addressed This Visit             ICD-10-CM    Primary osteoarthritis of first carpometacarpal joint of left hand M18.12           Assessment:  Progress towards functional goals: Improved mobility in thumb MCPJ and Improved performance in daily activities   Response to interventions:   OT held BTE program this visit due to significant increase in pain after last visit.  Today's treatment session focused on pinch strengthening with velcro checkers and clothespins.  Increased pain in thumb at completion of therapeutic activities.   Justification for continued skilled care: To address remaining functional, objective and subjective deficits to allow the patient to return to full independence with ADLs. Skilled intervention required to improve ROM which will improve function. Modify and progress home exercise program. Reduce pain to improve function.    Plan:  Progress exercises as tolerated to improve overall UE strength and performance in daily activities , Therapeutic exercises to strengthen L thumb  for functional use in daily  "activities. , Continued education of techniques such as heat to decrease joint stiffness and muscle tightness., Exercises to gradually increase range of motion., Manual therapy to  muscle tightness and improve joint mobility. , and Modalities as needed to address symptoms.    Subjective:  Adalberto reports he feels like his condition is worsening.  Progress towards functional goal:   Patient reports he was in pain for 3 days after last visit.      Pain Location L thumb base  Pain (0-10): 0    HEP adherence / understanding: compliance with the instructed home exercises.    Precautions:  Fall Risk: None    Precautions listed: Thumb CMC arthroplasty protocol    Therapy diagnoses:   1. Primary osteoarthritis of first carpometacarpal joint of left hand  Follow Up In Occupational Therapy          Objective: Measured on 24    Treatment Performed: (\"NP\" = Not Performed)     Therapeutic Exercise: NP    Therapeutic Activities: 13 min  Velcro checkers for pinch  Peg [place] for pinch    Manual Therapy: 5 min  Gentle joint traction and stretches to thumb & wrist  Neuromuscular Re-Education: NP  -   Modalities: 20 min  - Fluidotherapy with AROM left hand- 10 mins  - US to first dorsal compartment, webspace 3Mhz, 1.0w/cm2, continuous 10 min      Education: Reviewed home exercise program.    "

## 2024-12-30 DIAGNOSIS — I10 PRIMARY HYPERTENSION: ICD-10-CM

## 2024-12-30 RX ORDER — LISINOPRIL 20 MG/1
20 TABLET ORAL DAILY
Qty: 90 TABLET | Refills: 0 | Status: SHIPPED | OUTPATIENT
Start: 2024-12-30 | End: 2025-03-30

## 2024-12-30 NOTE — PROGRESS NOTES
OCCUPATIONAL THERAPY TREATMENT NOTE    Patient Name:  Adalberto Franklin Jr.   Patient MRN: [id number]  Date: 1/2/2025  Time Calculation  Start Time: 1630  Stop Time: 1710  Time Calculation (min): 40 min    Insurance:  Visit number: 11  Insurance Type: Payor: ANTHHANSEL / Plan: [employer] [place] / Product Type: *No Product type* /   Authorization or Plan of Care date Range: med necessity   Copay: n/a  Referred by: Jose R Rolle MD        OT Therapeutic Procedures Time Entry  Manual Therapy Time Entry: 10  Therapeutic Exercise Time Entry: 5  OT Modalities Time Entry  Ultrasound Time Entry: 10  Whirlpool Time Entry: 15                  General:  Reason for visit: left thumb CMC arthroplasty  Referred by: Dr. Rolle    Type of surgery: Left First Carpometacarpal Arthroplasty With C-arm - Left  Date of surgery: 10/15/2024  Days since surgery: 79    Current Problem:         Problem List Items Addressed This Visit             ICD-10-CM    Primary osteoarthritis of first carpometacarpal joint of left hand M18.12    Decreased  strength of left hand - Primary R29.898           Assessment:  Progress towards functional goals: Improved mobility in thumb MCPJ and Improved performance in daily activities   Response to interventions:  Increased swelling and tenderness noted over thumb metacarpal and surgical scar. Trigger point therapy performed to reduce pain over dorsal aspect of 1st webspace. Introduced white theraputty activities to improve /pinch strength. Provided HEP handout and white putty. Patient notes increased pain with activities. Instructed patient to only complete theraputty exercises when he has no/minimal pain in his thumb and to discontinue exercises if they exacerbate his thumb pain. Patient verbalized full understanding of new exercises.    Justification for continued skilled care: To address remaining functional, objective and subjective deficits to allow the patient to return to full independence with  "ADLs. Skilled intervention required to improve ROM which will improve function. Modify and progress home exercise program. Reduce pain to improve function.    Plan:  Progress exercises as tolerated to improve overall UE strength and performance in daily activities , Therapeutic exercises to strengthen L thumb  for functional use in daily activities. , Continued education of techniques such as heat to decrease joint stiffness and muscle tightness., Exercises to gradually increase range of motion., Manual therapy to  muscle tightness and improve joint mobility. , and Modalities as needed to address symptoms.    Subjective:  Adalberto reports he feels like his condition is the same.  Progress towards functional goal:   Patient reports he has experienced significant pain in the L thumb. Describes the pain has tightness and throbbing. Notes the pain does not affect his sleep. Has been taking Advil for the pain. Continues to complete his Hep exercises. Attempted to  an 8# ham over the holidays and experienced severe pain in the L hand. Can only tolerate  5# objects with the L hand.   Pain Location L thumb base  Pain (0-10): 7    HEP adherence / understanding: compliance with the instructed home exercises.    Precautions:  Fall Risk: None    Precautions listed: Thumb CMC arthroplasty protocol    Therapy diagnoses:   1. Decreased  strength of left hand        2. Primary osteoarthritis of first carpometacarpal joint of left hand  Follow Up In Occupational Therapy          Objective: Measured on 24    Treatment Performed: (\"NP\" = Not Performed)     Therapeutic Exercise: 5 min  - Demonstrated white theraputty activities; provided HEP handout and white theraputty     Therapeutic Activities: NP    Manual Therapy: 10 min  - STM, trigger point therapy, and retrograde massage to L thumb and surgical scar area.     Neuromuscular Re-Education: NP  -   Modalities: 25 min  - Fluidotherapy with AROM left " hand- 15 mins  - US to first dorsal compartment, webspace 3Mhz, 1.0w/cm2, continuous 10 min      Education: Added white theraputty exercise(s) to HEP program  Access Code [email or web address]

## 2025-01-02 ENCOUNTER — TREATMENT (OUTPATIENT)
Dept: OCCUPATIONAL THERAPY | Facility: CLINIC | Age: 55
End: 2025-01-02
Payer: COMMERCIAL

## 2025-01-02 DIAGNOSIS — R29.898 DECREASED GRIP STRENGTH OF LEFT HAND: Primary | ICD-10-CM

## 2025-01-02 DIAGNOSIS — M18.12 PRIMARY OSTEOARTHRITIS OF FIRST CARPOMETACARPAL JOINT OF LEFT HAND: ICD-10-CM

## 2025-01-02 PROCEDURE — 97140 MANUAL THERAPY 1/> REGIONS: CPT | Mod: GO

## 2025-01-02 PROCEDURE — 97022 WHIRLPOOL THERAPY: CPT | Mod: GO

## 2025-01-02 PROCEDURE — 97035 APP MDLTY 1+ULTRASOUND EA 15: CPT | Mod: GO

## 2025-01-08 ENCOUNTER — TREATMENT (OUTPATIENT)
Dept: OCCUPATIONAL THERAPY | Facility: CLINIC | Age: 55
End: 2025-01-08
Payer: COMMERCIAL

## 2025-01-08 DIAGNOSIS — R29.898 DECREASED GRIP STRENGTH OF LEFT HAND: Primary | ICD-10-CM

## 2025-01-08 DIAGNOSIS — M18.12 PRIMARY OSTEOARTHRITIS OF FIRST CARPOMETACARPAL JOINT OF LEFT HAND: ICD-10-CM

## 2025-01-08 PROCEDURE — 97022 WHIRLPOOL THERAPY: CPT | Mod: GO

## 2025-01-08 PROCEDURE — 97140 MANUAL THERAPY 1/> REGIONS: CPT | Mod: GO

## 2025-01-08 PROCEDURE — 97035 APP MDLTY 1+ULTRASOUND EA 15: CPT | Mod: GO

## 2025-01-08 NOTE — PROGRESS NOTES
"OCCUPATIONAL THERAPY TREATMENT NOTE    Patient Name:  Adalberto Franklin Jr.   Patient MRN: [id number]  Date: 1/8/2025  Time Calculation  Start Time: 1315  Stop Time: 1400  Time Calculation (min): 45 min    Insurance:  Visit number: 12  Insurance Type: Payor: IRON / Plan: [employer] [employer] / Product Type: *No Product type* /   Authorization or Plan of Care date Range: med necessity   Copay: n/a  Referred by: Jose R Rolle MD        OT Therapeutic Procedures Time Entry  Manual Therapy Time Entry: 13  Therapeutic Exercise Time Entry: 7  OT Modalities Time Entry  Ultrasound Time Entry: 10  Whirlpool Time Entry: 15                  General:  Reason for visit: left thumb CMC arthroplasty  Referred by: Dr. Rolle    Type of surgery: Left First Carpometacarpal Arthroplasty With C-arm - Left  Date of surgery: 10/15/2024  Days since surgery: 85    Current Problem:         Problem List Items Addressed This Visit             ICD-10-CM    Primary osteoarthritis of first carpometacarpal joint of left hand M18.12    Decreased  strength of left hand - Primary R29.898     Assessment:  Progress towards functional goals: Improved mobility in thumb MCPJ and Improved performance in daily activities   Response to interventions:  Scar tissue palpated during STM. Patient notes pain relief with light traction to the L thumb. Performed thumb isometrics with therapist assistance to lightly strengthen thenar eminence muscles. Patient notes increased pain during isometrics, stating 'I'm going to feel this later.\" Therapist recommended applying heat or ice to L thumb tonight.    Justification for continued skilled care: To address remaining functional, objective and subjective deficits to allow the patient to return to full independence with ADLs. Skilled intervention required to improve ROM which will improve function. Modify and progress home exercise program. Reduce pain to improve function.    Plan:  Progress exercises as tolerated " "to improve overall UE strength and performance in daily activities , Therapeutic exercises to strengthen L thumb  for functional use in daily activities. , Continued education of techniques such as heat to decrease joint stiffness and muscle tightness., Exercises to gradually increase range of motion., Manual therapy to  muscle tightness and improve joint mobility. , and Modalities as needed to address symptoms.    Subjective:  Adalberto reports he feels like his condition is declining.  Progress towards functional goal:   Patient reports his thumb is getting worse. He has been woken up by the throbbing pain for 2 nights in a row. He has not been completing exercises d/t the increased pain.    Pain Location L thumb base  Pain (0-10): 6    HEP adherence / understanding: compliance with the instructed home exercises.    Precautions:  Fall Risk: None    Precautions listed: Thumb CMC arthroplasty protocol    Therapy diagnoses:   1. Decreased  strength of left hand        2. Primary osteoarthritis of first carpometacarpal joint of left hand  Follow Up In Occupational Therapy          Objective: Measured on 24    Treatment Performed: (\"NP\" = Not Performed)     Therapeutic Exercise: 7 min  - Thumb isometrics in all plane with 5 second holds assisted by therapist    Therapeutic Activities: NP    Manual Therapy: 13 min  - STM, trigger point therapy, and retrograde massage to L thumb and surgical scar area.   - Light traction and stretching to the L thumb     Neuromuscular Re-Education: NP  -   Modalities: 25 min  - Fluidotherapy with AROM left hand- 15 mins  - US to first dorsal compartment, webspace 3Mhz, 1.0w/cm2, continuous 10 min      Education: Reviewed home exercise program.    "

## 2025-01-10 ENCOUNTER — TREATMENT (OUTPATIENT)
Dept: OCCUPATIONAL THERAPY | Facility: CLINIC | Age: 55
End: 2025-01-10
Payer: COMMERCIAL

## 2025-01-10 DIAGNOSIS — M18.12 PRIMARY OSTEOARTHRITIS OF FIRST CARPOMETACARPAL JOINT OF LEFT HAND: ICD-10-CM

## 2025-01-10 DIAGNOSIS — R29.898 DECREASED GRIP STRENGTH OF LEFT HAND: Primary | ICD-10-CM

## 2025-01-10 PROCEDURE — 97035 APP MDLTY 1+ULTRASOUND EA 15: CPT | Mod: GO

## 2025-01-10 PROCEDURE — 97022 WHIRLPOOL THERAPY: CPT | Mod: GO

## 2025-01-10 PROCEDURE — 97140 MANUAL THERAPY 1/> REGIONS: CPT | Mod: GO

## 2025-01-13 ENCOUNTER — TREATMENT (OUTPATIENT)
Dept: OCCUPATIONAL THERAPY | Facility: CLINIC | Age: 55
End: 2025-01-13
Payer: COMMERCIAL

## 2025-01-13 DIAGNOSIS — M18.12 PRIMARY OSTEOARTHRITIS OF FIRST CARPOMETACARPAL JOINT OF LEFT HAND: ICD-10-CM

## 2025-01-13 DIAGNOSIS — R29.898 DECREASED GRIP STRENGTH OF LEFT HAND: Primary | ICD-10-CM

## 2025-01-13 PROCEDURE — 97035 APP MDLTY 1+ULTRASOUND EA 15: CPT | Mod: GO | Performed by: OCCUPATIONAL THERAPIST

## 2025-01-13 PROCEDURE — 97140 MANUAL THERAPY 1/> REGIONS: CPT | Mod: GO | Performed by: OCCUPATIONAL THERAPIST

## 2025-01-13 PROCEDURE — 97022 WHIRLPOOL THERAPY: CPT | Mod: GO | Performed by: OCCUPATIONAL THERAPIST

## 2025-01-13 NOTE — PROGRESS NOTES
OCCUPATIONAL THERAPY TREATMENT NOTE    Patient Name:  Adalberto Franklin Jr.   Patient MRN: [id number]  Date: 1/13/2025  Time Calculation  Start Time: 0255  Stop Time: 0340  Time Calculation (min): 45 min    Insurance:  Visit number: 14  Insurance Type: Payor: ANTHEM / Plan: [employer] [place] / Product Type: *No Product type* /   Authorization or Plan of Care date Range: med necessity   Copay: n/a  Referred by: Jose R Rolle MD        OT Therapeutic Procedures Time Entry  Manual Therapy Time Entry: 13  Therapeutic Exercise Time Entry: 7  OT Modalities Time Entry  Ultrasound Time Entry: 10  Whirlpool Time Entry: 15                  General:  Reason for visit: left thumb CMC arthroplasty  Referred by: Dr. Rolle    Type of surgery: Left First Carpometacarpal Arthroplasty With C-arm - Left  Date of surgery: 10/15/2024  Days since surgery: 90    Current Problem:         Problem List Items Addressed This Visit             ICD-10-CM    Primary osteoarthritis of first carpometacarpal joint of left hand M18.12    Decreased  strength of left hand - Primary R29.898     Assessment:  Progress towards functional goals: Improved mobility in thumb MCPJ and Improved performance in daily activities   Response to interventions:  Sharp pain in the CMC joint with pinch of the Peg [place] clips.  He was able to complete remainder of activity with verbal cues to decrease intensity of pinch to removal clips from board.  Patient returning to Dr. Rolle on Wednesday and will discuss return to work.    Justification for continued skilled care: To address remaining functional, objective and subjective deficits to allow the patient to return to full independence with ADLs. Skilled intervention required to improve ROM which will improve function. Modify and progress home exercise program. Reduce pain to improve function.    Plan:  Progress exercises as tolerated to improve overall UE strength and performance in daily activities ,  "Therapeutic exercises to strengthen L thumb  for functional use in daily activities. , Continued education of techniques such as heat to decrease joint stiffness and muscle tightness., Exercises to gradually increase range of motion., Manual therapy to  muscle tightness and improve joint mobility. , and Modalities as needed to address symptoms.    Subjective:  Adalberto reports he feels like his condition is the same.  Progress towards functional goal:   Patient reports he has a deep joint ache with pinch and pull activities such as donning socks, undergarments, etc. He attempted isometrics and was able to complete reps and sets and increased pain levels for several hours after.   Pain Location L thumb base  Pain (0-10): 2    HEP adherence / understanding: compliance with the instructed home exercises.    Precautions:  Fall Risk: None    Precautions listed: Thumb CMC arthroplasty protocol    Therapy diagnoses:   1. Decreased  strength of left hand        2. Primary osteoarthritis of first carpometacarpal joint of left hand  Follow Up In Occupational Therapy          Objective: Measured on 24    Treatment Performed: (\"NP\" = Not Performed)     Therapeutic Exercise: 7 min  In hand manipulation of large pegs onto mat with stacking and thumb extension to release pegs from each other  Peg [place] pinch task  Therapeutic Activities: NP    Manual Therapy: 13 min  - STM retrograde massage to L thumb and surgical scar area.   - Light traction and stretching to the L thumb and wrist with passive stretches    Neuromuscular Re-Education: NP  -   Modalities: 25 min  - Fluidotherapy with AROM left hand- 15 mins  - US to first dorsal compartment, webspace 3Mhz, 1.0w/cm2, continuous 10 min    Education: Reviewed home exercise program.   "

## 2025-01-15 ENCOUNTER — HOSPITAL ENCOUNTER (OUTPATIENT)
Dept: RADIOLOGY | Facility: CLINIC | Age: 55
Discharge: HOME | End: 2025-01-15
Payer: COMMERCIAL

## 2025-01-15 ENCOUNTER — TREATMENT (OUTPATIENT)
Dept: OCCUPATIONAL THERAPY | Facility: CLINIC | Age: 55
End: 2025-01-15
Payer: COMMERCIAL

## 2025-01-15 ENCOUNTER — OFFICE VISIT (OUTPATIENT)
Dept: ORTHOPEDIC SURGERY | Facility: CLINIC | Age: 55
End: 2025-01-15
Payer: COMMERCIAL

## 2025-01-15 VITALS — BODY MASS INDEX: 30.8 KG/M2 | WEIGHT: 220 LBS | HEIGHT: 71 IN

## 2025-01-15 DIAGNOSIS — M18.12 PRIMARY OSTEOARTHRITIS OF FIRST CARPOMETACARPAL JOINT OF LEFT HAND: ICD-10-CM

## 2025-01-15 DIAGNOSIS — R29.898 DECREASED GRIP STRENGTH OF LEFT HAND: Primary | ICD-10-CM

## 2025-01-15 DIAGNOSIS — M18.12 PRIMARY OSTEOARTHRITIS OF FIRST CARPOMETACARPAL JOINT OF LEFT HAND: Primary | ICD-10-CM

## 2025-01-15 PROCEDURE — 1036F TOBACCO NON-USER: CPT | Performed by: ORTHOPAEDIC SURGERY

## 2025-01-15 PROCEDURE — 73140 X-RAY EXAM OF FINGER(S): CPT | Mod: LT

## 2025-01-15 PROCEDURE — 97022 WHIRLPOOL THERAPY: CPT | Mod: GO | Performed by: OCCUPATIONAL THERAPIST

## 2025-01-15 PROCEDURE — 97035 APP MDLTY 1+ULTRASOUND EA 15: CPT | Mod: GO | Performed by: OCCUPATIONAL THERAPIST

## 2025-01-15 PROCEDURE — 73140 X-RAY EXAM OF FINGER(S): CPT | Mod: LEFT SIDE | Performed by: RADIOLOGY

## 2025-01-15 PROCEDURE — 3008F BODY MASS INDEX DOCD: CPT | Performed by: ORTHOPAEDIC SURGERY

## 2025-01-15 PROCEDURE — 97140 MANUAL THERAPY 1/> REGIONS: CPT | Mod: GO | Performed by: OCCUPATIONAL THERAPIST

## 2025-01-15 PROCEDURE — 99211 OFF/OP EST MAY X REQ PHY/QHP: CPT | Performed by: ORTHOPAEDIC SURGERY

## 2025-01-15 NOTE — PROGRESS NOTES
Subjective    Patient ID: Adalberto Franklin Jr. is a 54 y.o. male.    Chief Complaint: Follow-up (F/U LEFT FIRST CARPOMETACARPAL ARTHROPLASTY /DOS: 10/15/24)     Last Surgery: Left First Carpometacarpal Arthroplasty With C-arm - Left  Last Surgery Date: 10/15/2024    HPI  The patient comes in for a 3-month follow-up after undergoing a left first CMC arthroplasty.  He states his pain is significantly improved compared to surgery.  However he is still working on regaining his strength.  He is undergoing therapy at this time.    Objective   Ortho Exam  The patient is in no acute distress.  Exam of his left thumb reveals his incision is well-healed.  He has appropriate function of his EPL and FPL.  He can oppose his left thumb to the fifth MCP joint volarly.    Image Results:  X-rays of his left thumb were personally reviewed today.  They do show evidence of the trapezium excision.  There has been mild subsidence of the first metacarpal.    Assessment/Plan   Encounter Diagnoses:  Primary osteoarthritis of first carpometacarpal joint of left hand    Orders Placed This Encounter    XR fingers left 2+ views     The patient is improving following his left first CMC arthroplasty.  He will continue with therapy for strengthening.  He will follow-up as his symptoms dictate.

## 2025-01-15 NOTE — PROGRESS NOTES
OCCUPATIONAL THERAPY TREATMENT NOTE    Patient Name:  Adalberto Franklin Jr.   Patient MRN: [id number]  Date: 1/15/2025  Time Calculation  Start Time: 1215  Stop Time: 1300  Time Calculation (min): 45 min    Insurance:  Visit number: 14  Insurance Type: Payor: ANTHHANSEL / Plan: [employer] [place] / Product Type: *No Product type* /   Authorization or Plan of Care date Range: med necessity   Copay: n/a  Referred by: Jose R Rolle MD        OT Therapeutic Procedures Time Entry  Manual Therapy Time Entry: 13  Therapeutic Activity Time Entry: 7  OT Modalities Time Entry  Ultrasound Time Entry: 10  Whirlpool Time Entry: 15                  General:  Reason for visit: left thumb CMC arthroplasty  Referred by: Dr. Rolle    Type of surgery: Left First Carpometacarpal Arthroplasty With C-arm - Left  Date of surgery: 10/15/2024  Days since surgery: 92    Current Problem:         Problem List Items Addressed This Visit             ICD-10-CM    Primary osteoarthritis of first carpometacarpal joint of left hand M18.12    Decreased  strength of left hand - Primary R29.898     Assessment:  Progress towards functional goals: Improved mobility in thumb MCPJ and Improved performance in daily activities   Response to interventions:  Patient was able to complete resisted clothespin pinch with weakness and discomfort with 4# of resistance.  He attempted blue clothespin with 6# of resistance and was unable to de-press due to weakness in lateral pinch.    Justification for continued skilled care: To address remaining functional, objective and subjective deficits to allow the patient to return to full independence with ADLs. Skilled intervention required to improve ROM which will improve function. Modify and progress home exercise program. Reduce pain to improve function.    Plan:  Progress exercises as tolerated to improve overall UE strength and performance in daily activities , Therapeutic exercises to strengthen L thumb  for  "functional use in daily activities. , Continued education of techniques such as heat to decrease joint stiffness and muscle tightness., Exercises to gradually increase range of motion., Manual therapy to  muscle tightness and improve joint mobility. , and Modalities as needed to address symptoms.    Subjective:  Adalberto reports he feels like his condition is the same.  Progress towards functional goal:   Patient saw Dr. Rolle prior to visit today and he is going to back to work 25.  Dr. Rolle does not need to follow up with Adalberto unless there is a problem when he returns to work..   Pain Location L thumb base  Pain (0-10): 2    HEP adherence / understanding: compliance with the instructed home exercises.    Precautions:  Fall Risk: None    Precautions listed: Thumb CMC arthroplasty protocol    Therapy diagnoses:   1. Decreased  strength of left hand        2. Primary osteoarthritis of first carpometacarpal joint of left hand  Follow Up In Occupational Therapy          Objective: Measured on 24    Treatment Performed: (\"NP\" = Not Performed)     Therapeutic Exercise: NP  Therapeutic Activities: 7 min  Resisted clothespins 1#-4# with reach onto column and various levels  3D pegboard pinch and placement of dowels into board onto vertical.  Review of thumb resisted extension with rubber band vs. Isometrics  Manual Therapy: 13 min  - STM retrograde massage to L thumb and surgical scar area.   - Light traction and stretching to the L thumb and wrist with passive stretches    Neuromuscular Re-Education: NP  -   Modalities: 25 min  - Fluidotherapy with AROM left hand- 15 mins  - US to first dorsal compartment, webspace 3Mhz, 1.0w/cm2, continuous 10 min    Education: Reviewed home exercise program.   "

## 2025-01-17 NOTE — PROGRESS NOTES
OCCUPATIONAL THERAPY TREATMENT NOTE    Patient Name:  Adalberto Franklin Jr.   Patient MRN: [id number]  Date: 1/20/2025  Time Calculation  Start Time: 1102  Stop Time: 1142  Time Calculation (min): 40 min    Insurance:  Visit number: 15  Insurance Type: Payor: IRON / Plan: [employer] [place] / Product Type: *No Product type* /   Authorization or Plan of Care date Range: med necessity   Copay: n/a  Referred by: Jose R Rolle MD        OT Therapeutic Procedures Time Entry  Manual Therapy Time Entry: 7  Therapeutic Activity Time Entry: 8  OT Modalities Time Entry  Ultrasound Time Entry: 10  Whirlpool Time Entry: 15                  General:  Reason for visit: left thumb CMC arthroplasty  Referred by: Dr. Rolle    Type of surgery: Left First Carpometacarpal Arthroplasty With C-arm - Left  Date of surgery: 10/15/2024  Days since surgery: 97    Current Problem:         Problem List Items Addressed This Visit             ICD-10-CM    Primary osteoarthritis of first carpometacarpal joint of left hand M18.12    Decreased  strength of left hand - Primary R29.898     Assessment:  Progress towards functional goals: Improved mobility in thumb MCPJ and Improved performance in daily activities   Response to interventions:  Reduced stiffness and swelling noted to L thumb. Challenged patient to employ sustain pinch and tweezers manipulation during O'Perez Pegboard. Mild difficulties noted when placing pegs into board. Patient experienced soreness in L thumb but tolerated full duration of the activity.   Justification for continued skilled care: To address remaining functional, objective and subjective deficits to allow the patient to return to full independence with ADLs. Skilled intervention required to improve ROM which will improve function. Modify and progress home exercise program. Reduce pain to improve function.    Plan:  Progress exercises as tolerated to improve overall UE strength and performance in daily  "activities , Therapeutic exercises to strengthen L thumb  for functional use in daily activities. , Continued education of techniques such as heat to decrease joint stiffness and muscle tightness., Exercises to gradually increase range of motion., Manual therapy to  muscle tightness and improve joint mobility. , and Modalities as needed to address symptoms.    Subjective:  Adalberto reports he feels like his condition is the same.  Progress towards functional goal:   Patient reports he has no pain in the L thumb but continues to have stiffness. Has been completing his HEP exercises with the rubber band and notes that has helped strengthen the thumb.    Pain Location L thumb base  Pain (0-10): 0    HEP adherence / understanding: compliance with the instructed home exercises.    Precautions:  Fall Risk: None    Precautions listed: Thumb CMC arthroplasty protocol    Therapy diagnoses:   1. Decreased  strength of left hand        2. Primary osteoarthritis of first carpometacarpal joint of left hand  Follow Up In Occupational Therapy          Objective: Measured on 24    Treatment Performed: (\"NP\" = Not Performed)     Therapeutic Exercise: NP    Therapeutic Activities: 8 min  - Pinch, place, and remove pegs with tweezers in a square formation during O'Perez pegboard    Manual Therapy: 7 min  - STM retrograde massage to L thumb and surgical scar area.   - Light traction and stretching to the L thumb and wrist with passive stretches    Neuromuscular Re-Education: NP  -   Modalities: 25 min  - Fluidotherapy with AROM left hand- 15 mins  - US to first dorsal compartment, webspace 3Mhz, 1.0w/cm2, continuous 10 min    Education: Reviewed home exercise program.   "

## 2025-01-20 ENCOUNTER — TREATMENT (OUTPATIENT)
Dept: OCCUPATIONAL THERAPY | Facility: CLINIC | Age: 55
End: 2025-01-20
Payer: COMMERCIAL

## 2025-01-20 DIAGNOSIS — M18.12 PRIMARY OSTEOARTHRITIS OF FIRST CARPOMETACARPAL JOINT OF LEFT HAND: ICD-10-CM

## 2025-01-20 DIAGNOSIS — R29.898 DECREASED GRIP STRENGTH OF LEFT HAND: Primary | ICD-10-CM

## 2025-01-20 PROCEDURE — 97530 THERAPEUTIC ACTIVITIES: CPT | Mod: GO

## 2025-01-20 PROCEDURE — 97035 APP MDLTY 1+ULTRASOUND EA 15: CPT | Mod: GO

## 2025-01-20 PROCEDURE — 97022 WHIRLPOOL THERAPY: CPT | Mod: GO

## 2025-01-22 ENCOUNTER — DOCUMENTATION (OUTPATIENT)
Dept: OCCUPATIONAL THERAPY | Facility: CLINIC | Age: 55
End: 2025-01-22
Payer: COMMERCIAL

## 2025-01-22 ENCOUNTER — APPOINTMENT (OUTPATIENT)
Dept: OCCUPATIONAL THERAPY | Facility: CLINIC | Age: 55
End: 2025-01-22
Payer: COMMERCIAL

## 2025-01-22 DIAGNOSIS — R29.898 DECREASED GRIP STRENGTH OF LEFT HAND: Primary | ICD-10-CM

## 2025-01-22 NOTE — PROGRESS NOTES
Mandy requesting a refill of the below medication which has been pended for you:     Requested Prescriptions     Pending Prescriptions Disp Refills    apixaban (ELIQUIS) 5 MG TABS tablet 60 tablet 0     Sig: Take 1 tablet by mouth 2 times daily    baclofen (LIORESAL) 20 MG tablet 40 tablet 0     Sig: Take 1 tablet by mouth 4 times daily as needed (muscle spasms)    calcipotriene (DOVONEX) 0.005 % cream 120 g 0     Sig: Apply topically 2 times daily.    diazePAM (VALIUM) 5 MG tablet 40 tablet 0     Sig: Take 1 tablet by mouth every 6 hours as needed (muscle spasms) for up to 10 days. Max Daily Amount: 20 mg    tamsulosin (FLOMAX) 0.4 MG capsule 30 capsule 0     Sig: Take 1 capsule by mouth nightly    gabapentin (NEURONTIN) 300 MG capsule 90 capsule 0     Sig: Take 1 capsule by mouth 3 times daily for 90 doses.    pregabalin (LYRICA) 100 MG capsule 120 capsule 0     Sig: Take 1 capsule by mouth 4 times daily for 90 days. Max Daily Amount: 400 mg    oxyCODONE HCl (OXY-IR) 10 MG immediate release tablet 120 tablet 0     Sig: Take 1 tablet by mouth every 6 hours as needed for Pain for up to 30 days. Intended supply: 30 days Max Daily Amount: 40 mg    desvenlafaxine succinate (PRISTIQ) 100 MG TB24 extended release tablet 30 tablet 0     Sig: Take 1 tablet by mouth daily         Allergies   Allergen Reactions    Penicillins Anaphylaxis     Hives and difficulty breathing      Occupational Therapy                 Therapy Communication Note    Patient Name: Adalberto Franklin Jr.  MRN: [id number]  Department: [address]  Room: Room/bed info not found  Today's Date: 1/22/2025     Discipline: Occupational Therapy          Missed Visit Reason: Patient cancelled due to weather     Missed Time: Cancel    Comment:

## 2025-01-24 NOTE — PROGRESS NOTES
OCCUPATIONAL THERAPY TREATMENT NOTE    Patient Name:  Adalberto Franklin Jr.   Patient MRN: [id number]  Date: 1/27/2025  Time Calculation  Start Time: 1101  Stop Time: 1143  Time Calculation (min): 42 min    Insurance:  Visit number: 16  Insurance Type: Payor: IRON / Plan: [place] / Product Type: *No Product type* /   Authorization or Plan of Care date Range: med necessity   Copay: n/a  Referred by: Jose R Rolle MD        OT Therapeutic Procedures Time Entry  Therapeutic Activity Time Entry: 17  OT Modalities Time Entry  Ultrasound Time Entry: 10  Whirlpool Time Entry: 15                  General:  Reason for visit: left thumb CMC arthroplasty  Referred by: Dr. Rolle    Type of surgery: Left First Carpometacarpal Arthroplasty With C-arm - Left  Date of surgery: 10/15/2024  Days since surgery: 104    Current Problem:         Problem List Items Addressed This Visit             ICD-10-CM    Primary osteoarthritis of first carpometacarpal joint of left hand M18.12    Decreased  strength of left hand - Primary R29.898     Assessment:  Progress towards functional goals: Improved mobility in thumb MCPJ and Improved performance in daily activities   Response to interventions:  Decreased swelling noted in L thumb. Challenged patient to perform BTE program to strengthen L hand/thumb in preparation to return to work. Patient experienced soreness in L thumb during BTE activities but tolerated full duration of activities. Patient required rest breaks after each activity d/t the soreness. Patient states his L thumb will be sore tonight.   Justification for continued skilled care: To address remaining functional, objective and subjective deficits to allow the patient to return to full independence with ADLs. Skilled intervention required to improve ROM which will improve function. Modify and progress home exercise program. Reduce pain to improve function.    Plan:  Progress exercises as tolerated to improve  "overall UE strength and performance in daily activities , Therapeutic exercises to strengthen L thumb  for functional use in daily activities. , Continued education of techniques such as heat to decrease joint stiffness and muscle tightness., Exercises to gradually increase range of motion., Manual therapy to  muscle tightness and improve joint mobility. , and Modalities as needed to address symptoms.    Subjective:  Adalberto reports he feels like his condition is the same.  Progress towards functional goal:   Patient states his thumb was doing well until he rolled on it on Saturday and heard a \"pop.\" Has had some pain in the thumb since then. Has been alternating between foam and putty exercises to prepare for work tasks.   Pain Location L thumb base  Pain (0-10): 4    HEP adherence / understanding: compliance with the instructed home exercises.    Precautions:  Fall Risk: None    Precautions listed: Thumb CMC arthroplasty protocol    Therapy diagnoses:   1. Decreased  strength of left hand        2. Primary osteoarthritis of first carpometacarpal joint of left hand  Follow Up In Occupational Therapy          Objective: Measured on 24    Treatment Performed: (\"NP\" = Not Performed)     Therapeutic Exercise: NP    Therapeutic Activities: 17 min  -  wrist flex/ext 5R; 120 secs  -  wrist rd/ul dev 3R; 120 secs   -  forearm sup/pro 2R; 120 secs   -   5R; 2 x 90 secs   -  pinch 2R; 90 secs     Manual Therapy: NP    Neuromuscular Re-Education: NP  -   Modalities: 25 min  - Fluidotherapy with AROM left hand- 15 mins  - US to first dorsal compartment, webspace 3Mhz, 1.0w/cm2, continuous 10 min    Education: Reviewed home exercise program.   "

## 2025-01-27 ENCOUNTER — TREATMENT (OUTPATIENT)
Dept: OCCUPATIONAL THERAPY | Facility: CLINIC | Age: 55
End: 2025-01-27
Payer: COMMERCIAL

## 2025-01-27 DIAGNOSIS — R29.898 DECREASED GRIP STRENGTH OF LEFT HAND: Primary | ICD-10-CM

## 2025-01-27 DIAGNOSIS — M18.12 PRIMARY OSTEOARTHRITIS OF FIRST CARPOMETACARPAL JOINT OF LEFT HAND: ICD-10-CM

## 2025-01-27 PROCEDURE — 97022 WHIRLPOOL THERAPY: CPT | Mod: GO

## 2025-01-27 PROCEDURE — 97530 THERAPEUTIC ACTIVITIES: CPT | Mod: GO

## 2025-01-27 PROCEDURE — 97035 APP MDLTY 1+ULTRASOUND EA 15: CPT | Mod: GO

## 2025-01-27 NOTE — PROGRESS NOTES
OCCUPATIONAL THERAPY TREATMENT NOTE    Patient Name:  Adalberto Franklin Jr.   Patient MRN: [id number]  Date: 1/29/2025  Time Calculation  Start Time: 1116  Stop Time: 1158  Time Calculation (min): 42 min    Insurance:  Visit number: 17  Insurance Type: Payor: ANTHHANSEL / Plan: [employer] [place] / Product Type: *No Product type* /   Authorization or Plan of Care date Range: med necessity   Copay: n/a  Referred by: Jose R Rolle MD        OT Therapeutic Procedures Time Entry  Therapeutic Activity Time Entry: 17  OT Modalities Time Entry  Ultrasound Time Entry: 10  Whirlpool Time Entry: 15                  General:  Reason for visit: left thumb CMC arthroplasty  Referred by: Dr. Rolle    Type of surgery: Left First Carpometacarpal Arthroplasty With C-arm - Left  Date of surgery: 10/15/2024  Days since surgery: 106    Current Problem:         Problem List Items Addressed This Visit             ICD-10-CM    Primary osteoarthritis of first carpometacarpal joint of left hand M18.12    Decreased  strength of left hand - Primary R29.898     Assessment:  Progress towards functional goals: Improved mobility in thumb MCPJ and Improved performance in daily activities   Response to interventions:  Tenderness to palpation over CMC joint this visit.  Adalberto completed BTE program with increased tolerance to  and pinch tool as he was able to complete exercises for increased time.   Justification for continued skilled care: To address remaining functional, objective and subjective deficits to allow the patient to return to full independence with ADLs. Skilled intervention required to improve ROM which will improve function. Modify and progress home exercise program. Reduce pain to improve function.    Plan:  Progress exercises as tolerated to improve overall UE strength and performance in daily activities , Therapeutic exercises to strengthen L thumb  for functional use in daily activities. , Continued education of  "techniques such as heat to decrease joint stiffness and muscle tightness., Exercises to gradually increase range of motion., Manual therapy to  muscle tightness and improve joint mobility. , and Modalities as needed to address symptoms.  Patient to return to work next week    Subjective:  Adalberto reports he feels like his condition is the same.  Progress towards functional goal:   Patient states he was not as sore as he anticipated after increasing BTE program resistance last visit.   Pain Location L thumb base  Pain (0-10): 4    HEP adherence / understanding: compliance with the instructed home exercises.    Precautions:  Fall Risk: None    Precautions listed: Thumb CMC arthroplasty protocol    Therapy diagnoses:   1. Decreased  strength of left hand        2. Primary osteoarthritis of first carpometacarpal joint of left hand  Follow Up In Occupational Therapy          Objective: Measured on 24    Treatment Performed: (\"NP\" = Not Performed)     Therapeutic Exercise: NP    Therapeutic Activities: 17 min  -  wrist flex/ext 5R; 120 secs  -  wrist rd/ul dev 3R; 120 secs   -  forearm sup/pro 2R; 120 secs   -   5R; 2 x 100 secs   -  pinch 2R; 100 secs     Manual Therapy: NP    Neuromuscular Re-Education: NP  -   Modalities: 25 min  - Fluidotherapy with AROM left hand- 15 mins  - US to first dorsal compartment, webspace 3Mhz, 1.0w/cm2, continuous 10 min    Education: Reviewed home exercise program.   "

## 2025-01-29 ENCOUNTER — TREATMENT (OUTPATIENT)
Dept: OCCUPATIONAL THERAPY | Facility: CLINIC | Age: 55
End: 2025-01-29
Payer: COMMERCIAL

## 2025-01-29 DIAGNOSIS — R29.898 DECREASED GRIP STRENGTH OF LEFT HAND: Primary | ICD-10-CM

## 2025-01-29 DIAGNOSIS — M18.12 PRIMARY OSTEOARTHRITIS OF FIRST CARPOMETACARPAL JOINT OF LEFT HAND: ICD-10-CM

## 2025-01-29 PROCEDURE — 97530 THERAPEUTIC ACTIVITIES: CPT | Mod: GO | Performed by: OCCUPATIONAL THERAPIST

## 2025-01-29 PROCEDURE — 97022 WHIRLPOOL THERAPY: CPT | Mod: GO | Performed by: OCCUPATIONAL THERAPIST

## 2025-01-29 PROCEDURE — 97035 APP MDLTY 1+ULTRASOUND EA 15: CPT | Mod: GO | Performed by: OCCUPATIONAL THERAPIST

## 2025-01-30 NOTE — PROGRESS NOTES
OCCUPATIONAL THERAPY TREATMENT NOTE    Patient Name:  Adalberto Franklin Jr.   Patient MRN: [id number]  Date: 2/3/2025  Time Calculation  Start Time: 1101  Stop Time: 1140  Time Calculation (min): 39 min    Insurance:  Visit number: 18  Insurance Type: Payor: ANTHHANSEL / Plan: [employer] [place] / Product Type: *No Product type* /   Authorization or Plan of Care date Range: med necessity   Copay: n/a  Referred by: Jose R Rolle MD        OT Therapeutic Procedures Time Entry  Manual Therapy Time Entry: 14  OT Modalities Time Entry  Ultrasound Time Entry: 10  Whirlpool Time Entry: 15                  General:  Reason for visit: left thumb CMC arthroplasty  Referred by: Dr. Rolle    Type of surgery: Left First Carpometacarpal Arthroplasty With C-arm - Left  Date of surgery: 10/15/2024  Days since surgery: 111    Current Problem:         Problem List Items Addressed This Visit             ICD-10-CM    Primary osteoarthritis of first carpometacarpal joint of left hand M18.12    Decreased  strength of left hand - Primary R29.898     Assessment:  Progress towards functional goals: Improved mobility in thumb MCPJ and Improved performance in daily activities   Response to interventions:  Session focused on modalities and pain management d/t patient having to work after the session. Muscle tightness to 1st webspace noted during STM. Normal thumb PROM achieved with light stretches. Patient notes pain relief at end of session.    Justification for continued skilled care: To address remaining functional, objective and subjective deficits to allow the patient to return to full independence with ADLs. Skilled intervention required to improve ROM which will improve function. Modify and progress home exercise program. Reduce pain to improve function.    Plan:  Progress exercises as tolerated to improve overall UE strength and performance in daily activities , Therapeutic exercises to strengthen L thumb  for functional use in  "daily activities. , Continued education of techniques such as heat to decrease joint stiffness and muscle tightness., Exercises to gradually increase range of motion., Manual therapy to  muscle tightness and improve joint mobility. , and Modalities as needed to address symptoms.    Subjective:  Adalberto reports he feels like his condition is the same.  Progress towards functional goal:   Patient has returned to work and has been performing lifting, pushing, and pulling tasks. Patient notes severe pain, increasing to a 8/10, in the thumb during work tasks. Patient has been wearing his thumb spica brace at work but has to take intermittent breaks if the pain increases. Notes he has been taking ibuprofen for the pain. He has not been applying heat to the L thumb.   Pain Location L thumb base  Pain (0-10): 4    HEP adherence / understanding: compliance with the instructed home exercises.    Precautions:  Fall Risk: None    Precautions listed: Thumb CMC arthroplasty protocol    Therapy diagnoses:   1. Decreased  strength of left hand        2. Primary osteoarthritis of first carpometacarpal joint of left hand  Follow Up In Occupational Therapy          Objective: Measured on 24    Treatment Performed: (\"NP\" = Not Performed)     Therapeutic Exercise: NP    Therapeutic Activities: NP    Manual Therapy: 14 mins   - STM to thenar eminence and 1st webspace  - Light mobilizations to L thumb in all planes    Neuromuscular Re-Education: NP  -   Modalities: 25 min  - Fluidotherapy with AROM left hand- 15 mins  - US to first dorsal compartment, webspace 3Mhz, 1.0w/cm2, continuous 10 min    Education: Reviewed home exercise program.   "

## 2025-02-03 ENCOUNTER — TREATMENT (OUTPATIENT)
Dept: OCCUPATIONAL THERAPY | Facility: CLINIC | Age: 55
End: 2025-02-03

## 2025-02-03 DIAGNOSIS — R29.898 DECREASED GRIP STRENGTH OF LEFT HAND: Primary | ICD-10-CM

## 2025-02-03 DIAGNOSIS — M18.12 PRIMARY OSTEOARTHRITIS OF FIRST CARPOMETACARPAL JOINT OF LEFT HAND: ICD-10-CM

## 2025-02-03 PROCEDURE — 97022 WHIRLPOOL THERAPY: CPT | Mod: GO

## 2025-02-03 PROCEDURE — 97140 MANUAL THERAPY 1/> REGIONS: CPT | Mod: GO

## 2025-02-03 PROCEDURE — 97035 APP MDLTY 1+ULTRASOUND EA 15: CPT | Mod: GO

## 2025-02-04 NOTE — PROGRESS NOTES
OCCUPATIONAL THERAPY TREATMENT NOTE    Patient Name:  Adalberto Franklin Jr.   Patient MRN: [id number]  Date: 2/5/2025

## 2025-02-05 ENCOUNTER — DOCUMENTATION (OUTPATIENT)
Dept: OCCUPATIONAL THERAPY | Facility: CLINIC | Age: 55
End: 2025-02-05

## 2025-02-05 ENCOUNTER — TREATMENT (OUTPATIENT)
Dept: OCCUPATIONAL THERAPY | Facility: CLINIC | Age: 55
End: 2025-02-05
Payer: COMMERCIAL

## 2025-02-05 DIAGNOSIS — R29.898 DECREASED GRIP STRENGTH OF LEFT HAND: Primary | ICD-10-CM

## 2025-02-05 DIAGNOSIS — M18.12 PRIMARY OSTEOARTHRITIS OF FIRST CARPOMETACARPAL JOINT OF LEFT HAND: ICD-10-CM

## 2025-02-05 NOTE — PROGRESS NOTES
Occupational Therapy                 Therapy Communication Note    Patient Name: Adalberto Franklin Jr.  MRN: [id number]  Department: [address]  Room: Room/bed info not found  Today's Date: 2/5/2025     Discipline: Occupational Therapy          Missed Visit Reason: Patient's appointment was cancelled today due to no insurance authorization.     Missed Time: Cancel    Comment:

## 2025-02-06 ENCOUNTER — TELEPHONE (OUTPATIENT)
Dept: ORTHOPEDIC SURGERY | Facility: CLINIC | Age: 55
End: 2025-02-06

## 2025-02-06 NOTE — TELEPHONE ENCOUNTER
Patient calling in saying that he is unable to work, he attempted to go back but was only able to work for about an hour. Wondering if he can have this past week off and then return on the 10th.

## 2025-02-10 ENCOUNTER — DOCUMENTATION (OUTPATIENT)
Dept: OCCUPATIONAL THERAPY | Facility: CLINIC | Age: 55
End: 2025-02-10
Payer: COMMERCIAL

## 2025-02-10 ENCOUNTER — APPOINTMENT (OUTPATIENT)
Dept: OCCUPATIONAL THERAPY | Facility: CLINIC | Age: 55
End: 2025-02-10
Payer: COMMERCIAL

## 2025-02-10 DIAGNOSIS — R29.898 DECREASED GRIP STRENGTH OF LEFT HAND: Primary | ICD-10-CM

## 2025-02-10 NOTE — PROGRESS NOTES
Occupational Therapy                 Therapy Communication Note    Patient Name: Adalberto Franklin Jr.  MRN: [id number]  Department:   Room: Room/bed info not found  Today's Date: 2/10/2025     Discipline: Occupational Therapy          Missed Visit Reason: Patient does not have insurance coverage at this time.     Missed Time: Cancel    Comment:

## 2025-02-11 ENCOUNTER — TELEPHONE (OUTPATIENT)
Dept: ORTHOPEDIC SURGERY | Facility: CLINIC | Age: 55
End: 2025-02-11
Payer: COMMERCIAL

## 2025-02-11 NOTE — TELEPHONE ENCOUNTER
Pt is still having hand pain with activity 9/10 pain. He works for [employer] and says it is difficult to work. He is requesting 1 week off of work. Please advise.

## 2025-02-17 ENCOUNTER — TELEPHONE (OUTPATIENT)
Dept: ORTHOPEDIC SURGERY | Facility: CLINIC | Age: 55
End: 2025-02-17
Payer: COMMERCIAL

## 2025-02-24 ENCOUNTER — TELEPHONE (OUTPATIENT)
Dept: ORTHOPEDIC SURGERY | Facility: CLINIC | Age: 55
End: 2025-02-24
Payer: COMMERCIAL

## 2025-02-26 ENCOUNTER — HOSPITAL ENCOUNTER (OUTPATIENT)
Dept: RADIOLOGY | Facility: CLINIC | Age: 55
Discharge: HOME | End: 2025-02-26
Payer: COMMERCIAL

## 2025-02-26 ENCOUNTER — OFFICE VISIT (OUTPATIENT)
Dept: ORTHOPEDIC SURGERY | Facility: CLINIC | Age: 55
End: 2025-02-26
Payer: COMMERCIAL

## 2025-02-26 DIAGNOSIS — M18.12 PRIMARY OSTEOARTHRITIS OF FIRST CARPOMETACARPAL JOINT OF LEFT HAND: Primary | ICD-10-CM

## 2025-02-26 DIAGNOSIS — M18.12 PRIMARY OSTEOARTHRITIS OF FIRST CARPOMETACARPAL JOINT OF LEFT HAND: ICD-10-CM

## 2025-02-26 PROCEDURE — 99213 OFFICE O/P EST LOW 20 MIN: CPT | Performed by: ORTHOPAEDIC SURGERY

## 2025-02-26 PROCEDURE — 73140 X-RAY EXAM OF FINGER(S): CPT | Mod: LT

## 2025-02-26 PROCEDURE — 1036F TOBACCO NON-USER: CPT | Performed by: ORTHOPAEDIC SURGERY

## 2025-02-26 NOTE — PROGRESS NOTES
Subjective    Patient ID: Adalberto Franklin Jr. is a 54 y.o. male.    Chief Complaint: Follow-up of the Left Thumb (FUV BL 1ST CMC INJ 9/4/24 /PT STATES THE INJ WAS HELPFUL FOR THE R THUMB, BUT NOT FOR THE LEFT) and Follow-up of the Right Thumb (FUV BL 1ST CMC INJ 9/4/24 /PT STATES THE INJ WAS HELPFUL FOR THE R THUMB, BUT NOT FOR THE LEFT)     Last Surgery: Left First Carpometacarpal Arthroplasty With C-arm - Left  Last Surgery Date: 10/15/2024    HPI  The patient comes in today for follow-up of his left first CMC arthroplasty.  He was concerned because he was still experiencing pain and difficulty with lifting objects over 25 pounds.  He at times would notice spasming in his left thumb over the past week.  He denies numbness and paresthesias.    Objective   Ortho Exam  The patient is in no acute distress.  Exam of his left thumb reveals there is mild swelling.  There is no warmth erythema.  He has appropriate function of his EPL and FPL.  He can oppose to his fourth MCP joint.    Image Results:  X-rays of his left thumb were personally reviewed.  They show evidence of the trapezium excision.  There has been no change in alignment of the first metacarpal compared to the previous x-ray.    Assessment/Plan   Encounter Diagnoses:  Primary osteoarthritis of first carpometacarpal joint of left hand    Orders Placed This Encounter    XR fingers left 2+ views     The patient is reassured that there has been no changes in x-ray of his left thumb.  He is encouraged to build up the strength and endurance in his left forearm function.  He will otherwise follow-up as his symptoms dictate.

## 2025-03-05 ENCOUNTER — TELEPHONE (OUTPATIENT)
Dept: ORTHOPEDIC SURGERY | Facility: CLINIC | Age: 55
End: 2025-03-05
Payer: COMMERCIAL

## 2025-03-05 DIAGNOSIS — M18.12 PRIMARY OSTEOARTHRITIS OF FIRST CARPOMETACARPAL JOINT OF LEFT HAND: Primary | ICD-10-CM

## 2025-03-05 RX ORDER — OXYCODONE AND ACETAMINOPHEN 5; 325 MG/1; MG/1
1 TABLET ORAL EVERY 6 HOURS PRN
Qty: 20 TABLET | Refills: 0 | Status: SHIPPED | OUTPATIENT
Start: 2025-03-05 | End: 2025-03-10

## 2025-03-05 NOTE — TELEPHONE ENCOUNTER
PT CALLED AND STATED THAT HE RETURNED BACK TO WORK AND NOW HE IS IN PAIN AND HIS HAND IS SWOLLEN.      HE WOULD LIKE TO TALK TO YOU.  PLEASE CALL AT YOUR CONVENIENCE

## 2025-03-19 ENCOUNTER — TELEPHONE (OUTPATIENT)
Dept: ORTHOPEDIC SURGERY | Facility: CLINIC | Age: 55
End: 2025-03-19
Payer: COMMERCIAL

## 2025-03-19 DIAGNOSIS — M18.12 PRIMARY OSTEOARTHRITIS OF FIRST CARPOMETACARPAL JOINT OF LEFT HAND: Primary | ICD-10-CM

## 2025-03-19 RX ORDER — OXYCODONE AND ACETAMINOPHEN 5; 325 MG/1; MG/1
1 TABLET ORAL EVERY 6 HOURS PRN
Qty: 20 TABLET | Refills: 0 | Status: SHIPPED | OUTPATIENT
Start: 2025-03-19 | End: 2025-03-24

## 2025-03-19 NOTE — TELEPHONE ENCOUNTER
Pt. Requesting a refill on:     Percocet 5-325 mg  Has 2 tablet left  Last rx: 3/5/25    Pharmacy:  [facility] on [address]    OARRS SCANNED 3/19/2025 ALP/CR

## 2025-03-31 ENCOUNTER — APPOINTMENT (OUTPATIENT)
Dept: RADIOLOGY | Facility: HOSPITAL | Age: 55
End: 2025-03-31
Payer: COMMERCIAL

## 2025-03-31 DIAGNOSIS — M18.12 PRIMARY OSTEOARTHRITIS OF FIRST CARPOMETACARPAL JOINT OF LEFT HAND: ICD-10-CM

## 2025-03-31 PROCEDURE — 76881 US COMPL JOINT R-T W/IMG: CPT

## 2025-04-07 DIAGNOSIS — I10 PRIMARY HYPERTENSION: ICD-10-CM

## 2025-04-07 RX ORDER — LISINOPRIL 20 MG/1
20 TABLET ORAL DAILY
Qty: 90 TABLET | Refills: 0 | Status: SHIPPED | OUTPATIENT
Start: 2025-04-07 | End: 2025-07-06

## 2025-04-07 RX ORDER — AMLODIPINE BESYLATE 5 MG/1
5 TABLET ORAL DAILY
Qty: 90 TABLET | Refills: 0 | Status: SHIPPED | OUTPATIENT
Start: 2025-04-07

## 2025-04-16 ENCOUNTER — OFFICE VISIT (OUTPATIENT)
Dept: ORTHOPEDIC SURGERY | Facility: CLINIC | Age: 55
End: 2025-04-16
Payer: COMMERCIAL

## 2025-04-16 DIAGNOSIS — M18.12 PRIMARY OSTEOARTHRITIS OF FIRST CARPOMETACARPAL JOINT OF LEFT HAND: Primary | ICD-10-CM

## 2025-04-16 PROCEDURE — 99213 OFFICE O/P EST LOW 20 MIN: CPT | Performed by: ORTHOPAEDIC SURGERY

## 2025-04-16 PROCEDURE — 1036F TOBACCO NON-USER: CPT | Performed by: ORTHOPAEDIC SURGERY

## 2025-04-16 NOTE — PROGRESS NOTES
Subjective    Patient ID: Adalberto Franklin Jr. is a 54 y.o. male.    Chief Complaint: Follow-up of the Left Hand ([facility] ULTRASOUND REVIEW)     Last Surgery: Left First Carpometacarpal Arthroplasty With C-arm - Left  Last Surgery Date: 10/15/2024    HPI  The patient returns today after undergoing a left thumb ultrasound to evaluate for any abnormalities that may have occurred since undergoing a left for CMC arthroplasty.  The patient states he will notice pain usually 2 to 3 hours after doing heavy lifting.  He also notices a paresthesia type of pain on the dorsum of his left thumb near the incision.    Objective   Ortho Exam  The patient is in no acute distress.  He still has mild to moderate swelling near the incision site.  However there is no evidence of infection.  He has appropriate function of his EPL and FPL.  He can oppose his thumb to the fifth MCP joint.  He does have a Tinel's sign near the incision site.    Image Results:  US MSK upper extremity joints tendons muscles  Narrative: Interpreted By:  Jose Bates,   STUDY:  US MSK UPPER EXTREMITY JOINTS TENDONS MUSCLES; ;  3/31/2025 1:26 pm      INDICATION:  Signs/Symptoms:Patient is status post a left first CMC arthroplasty.  There has been change in motion and pain.  Please evaluate for  loosening of non-radiopaque anchors.      ,M18.12 Unilateral primary osteoarthritis of first carpometacarpal  joint, left hand      COMPARISON:  None.      ACCESSION NUMBER(S):  [id number]      ORDERING CLINICIAN:  MOUNA GOOD      TECHNIQUE:  Focused sonographic evaluation of the left 1st and 2nd metatarsals  and metatarsophalangeal joints.      FINDINGS:  Proximal 2nd metatarsal shafts demonstrates a subtle hypoechoic  region of nodularity intimately associated with the cortex felt to  represent 1 of the suture anchor. An additional hypoechoic focus at  the base of the 2nd metatarsal is felt to represent the 2nd anchor.  Extending from this region is a linear  hypoechoic focus that extends  towards the base of the 1st CMC, however evaluation of the region is  otherwise limited due to shadowing due to postoperative changes.  Anchors associated with the 1st metatarsal are not definitively  visualized due to this artifact. No focal fluid collection or soft  tissue mass.      Impression: What are felt to represent soft tissue anchors of the 2nd metatarsal  base and proximal shaft are visualized. What is felt to represent  suture tape extending from the 2nd metatarsal base towards the 1st  CMC is visualized, however evaluation of the area directly overlying  the 1st CMC is limited due to postoperative shadowing artifact. The  anchors associated with the 1st metatarsal base and shaft are not  well visualized.          MACRO:  None      Signed by: Jose Bates 4/14/2025 1:24 PM  Dictation workstation:   [id number]      Assessment/Plan   Encounter Diagnoses:  Primary osteoarthritis of first carpometacarpal joint of left hand    The patient was reassured that the ultrasound showed no pathology at the site of the previous surgery.  He was encouraged to perform a deep massage to break up any scar tissue surrounding the sensory nerve.  He otherwise may continue to use his left hand is much as he can tolerate.  He will follow-up as his symptoms dictate.

## 2025-04-16 NOTE — LETTER
April 16, 2025     Patient: Adalberto Franklin Jr.   YOB: 1970   Date of Visit: 4/16/2025       To Whom It May Concern:    It is my medical opinion that Adalberto Franklin  may return to work on April 21, 2025 .    If you have any questions or concerns, please don't hesitate to call.         Sincerely,        Jose R Rolle MD    CC: No Recipients

## 2025-05-14 ENCOUNTER — OFFICE VISIT (OUTPATIENT)
Dept: ORTHOPEDIC SURGERY | Facility: CLINIC | Age: 55
End: 2025-05-14
Payer: COMMERCIAL

## 2025-05-14 DIAGNOSIS — M18.0 PRIMARY OSTEOARTHRITIS OF BOTH FIRST CARPOMETACARPAL JOINTS: ICD-10-CM

## 2025-05-14 DIAGNOSIS — M79.645 BILATERAL THUMB PAIN: Primary | ICD-10-CM

## 2025-05-14 DIAGNOSIS — M79.644 BILATERAL THUMB PAIN: Primary | ICD-10-CM

## 2025-05-14 PROCEDURE — 20600 DRAIN/INJ JOINT/BURSA W/O US: CPT | Mod: 50 | Performed by: ORTHOPAEDIC SURGERY

## 2025-05-14 PROCEDURE — 99213 OFFICE O/P EST LOW 20 MIN: CPT | Mod: 25 | Performed by: ORTHOPAEDIC SURGERY

## 2025-05-14 PROCEDURE — 99213 OFFICE O/P EST LOW 20 MIN: CPT | Performed by: ORTHOPAEDIC SURGERY

## 2025-05-14 PROCEDURE — 2500000004 HC RX 250 GENERAL PHARMACY W/ HCPCS (ALT 636 FOR OP/ED): Performed by: ORTHOPAEDIC SURGERY

## 2025-05-14 RX ORDER — LIDOCAINE HYDROCHLORIDE 20 MG/ML
0.5 INJECTION, SOLUTION INFILTRATION; PERINEURAL
Status: COMPLETED | OUTPATIENT
Start: 2025-05-14 | End: 2025-05-14

## 2025-05-14 RX ORDER — TRIAMCINOLONE ACETONIDE 40 MG/ML
10 INJECTION, SUSPENSION INTRA-ARTICULAR; INTRAMUSCULAR
Status: COMPLETED | OUTPATIENT
Start: 2025-05-14 | End: 2025-05-14

## 2025-05-14 RX ADMIN — TRIAMCINOLONE ACETONIDE 10 MG: 40 INJECTION, SUSPENSION INTRA-ARTICULAR; INTRAMUSCULAR at 14:38

## 2025-05-14 RX ADMIN — LIDOCAINE HYDROCHLORIDE 0.5 ML: 20 INJECTION, SOLUTION INFILTRATION; PERINEURAL at 14:38

## 2025-05-14 NOTE — Clinical Note
May 14, 2025     Patient: Adalberto NELSON Noemi Jimenez.   YOB: 1970   Date of Visit: 5/14/2025       To Whom It May Concern:    It is my medical opinion that Adalberto Trevortammy {Work release (duty restriction):[id number]}.    If you have any questions or concerns, please don't hesitate to call.         Sincerely,        Jose R Rolle MD    CC: No Recipients

## 2025-05-14 NOTE — PROGRESS NOTES
Subjective    Patient ID: Adalberto Franklin Jr. is a 54 y.o. male.    Chief Complaint: Follow-up of the Left Hand     Last Surgery: Left First Carpometacarpal Arthroplasty With C-arm - Left  Last Surgery Date: 10/15/2024    HPI  The patient comes in for follow-up of his left first CMC arthroplasty.  He is about 6 months out from surgery.  He is also coming in for follow-up of his right first CMC arthritis.  He has previously undergone injections on this side but those symptoms have returned.  His left-sided symptoms worsened as he returned to work.  He also noticed increased swelling.    Objective   Ortho Exam  The patient is in no acute distress.  Exam of his right thumb reveals the patient has tenderness at the first CMC joint.  He has a negative Finkelstein test.  He has a positive for CMC grind.  There is no evidence of a trigger thumb.    Exam of his left wrist reveals the incision is well-healed.  There is no increased swelling just under the incision and aspiration of this yielded a very small amount of clear fluid.  There is no evidence of infection.  He however was still quite tender in this region.    Image Results:  US MSK upper extremity joints tendons muscles  Narrative: Interpreted By:  Jose Bates,   STUDY:  US MSK UPPER EXTREMITY JOINTS TENDONS MUSCLES; ;  3/31/2025 1:26 pm      INDICATION:  Signs/Symptoms:Patient is status post a left first CMC arthroplasty.  There has been change in motion and pain.  Please evaluate for  loosening of non-radiopaque anchors.      ,M18.12 Unilateral primary osteoarthritis of first carpometacarpal  joint, left hand      COMPARISON:  None.      ACCESSION NUMBER(S):  [id number]      ORDERING CLINICIAN:  MOUNA GOOD      TECHNIQUE:  Focused sonographic evaluation of the left 1st and 2nd metatarsals  and metatarsophalangeal joints.      FINDINGS:  Proximal 2nd metatarsal shafts demonstrates a subtle hypoechoic  region of nodularity intimately associated with the  cortex felt to  represent 1 of the suture anchor. An additional hypoechoic focus at  the base of the 2nd metatarsal is felt to represent the 2nd anchor.  Extending from this region is a linear hypoechoic focus that extends  towards the base of the 1st CMC, however evaluation of the region is  otherwise limited due to shadowing due to postoperative changes.  Anchors associated with the 1st metatarsal are not definitively  visualized due to this artifact. No focal fluid collection or soft  tissue mass.      Impression: What are felt to represent soft tissue anchors of the 2nd metatarsal  base and proximal shaft are visualized. What is felt to represent  suture tape extending from the 2nd metatarsal base towards the 1st  CMC is visualized, however evaluation of the area directly overlying  the 1st CMC is limited due to postoperative shadowing artifact. The  anchors associated with the 1st metatarsal base and shaft are not  well visualized.          MACRO:  None      Signed by: Jose Bates 4/14/2025 1:24 PM  Dictation workstation:   [id number]      Assessment/Plan   Encounter Diagnoses:  Bilateral thumb pain    Primary osteoarthritis of both first carpometacarpal joints    The patient has known right first CMC arthritis and continues to have pain and swelling following his left first CMC arthroplasty.  He has had a full workup for the pain that continued after surgery.  However today he did have swelling in this region.  On the right side he definitely wished undergo an injection for symptomatic relief.  On the left side he was willing to undergo an injection for the increased swelling which may be due to aggravation of his surgical site.    S Inj/Asp: bilateral thumb CMC on 5/14/2025 2:38 PM  Indications: pain and joint swelling  Details: 25 G needle, dorsal approach  Medications (Right): 10 mg triamcinolone acetonide 40 mg/mL; 0.5 mL lidocaine 20 mg/mL (2 %)  Medications (Left): 10 mg triamcinolone acetonide 40  mg/mL; 0.5 mL lidocaine 20 mg/mL (2 %)  Outcome: tolerated well, no immediate complications  Procedure, treatment alternatives, risks and benefits explained, specific risks discussed. Consent was given by the patient. Immediately prior to procedure a time out was called to verify the correct patient, procedure, equipment, support staff and site/side marked as required. Patient was prepped and draped in the usual sterile fashion.       Patient was informed it would take a week for the injections have an effect.  The patient was also told of symptoms continue he may consider a second opinion on this chest to determine if the second set of ice helps.  The patient will otherwise follow-up as his symptoms dictate.

## 2025-05-14 NOTE — LETTER
May 14, 2025     Patient: Adalberto Franklin Jr.   YOB: 1970   Date of Visit: 5/14/2025       To Whom It May Concern:    It is my medical opinion that Adalberto Franklin will be off work from May 14, 2025 through May 19, 2025.  He may return in May 20, 2025.    If you have any questions or concerns, please don't hesitate to call.         Sincerely,        Jose R Rolle MD    CC: No Recipients

## 2025-05-19 ENCOUNTER — TELEPHONE (OUTPATIENT)
Dept: ORTHOPEDIC SURGERY | Facility: CLINIC | Age: 55
End: 2025-05-19

## 2025-05-19 NOTE — TELEPHONE ENCOUNTER
Copied from CRM #[id number]. Topic: Needs Earlier Appointment  >> May 16, 2025  3:54 PM Calvin MELTON wrote:  Patient needs earlier appointment.

## 2025-05-29 ENCOUNTER — OFFICE VISIT (OUTPATIENT)
Dept: URGENT CARE | Age: 55
End: 2025-05-29
Payer: COMMERCIAL

## 2025-05-29 VITALS
HEIGHT: 71 IN | RESPIRATION RATE: 18 BRPM | HEART RATE: 101 BPM | WEIGHT: 220 LBS | TEMPERATURE: 97.8 F | BODY MASS INDEX: 30.8 KG/M2 | SYSTOLIC BLOOD PRESSURE: 134 MMHG | OXYGEN SATURATION: 97 % | DIASTOLIC BLOOD PRESSURE: 83 MMHG

## 2025-05-29 DIAGNOSIS — R05.9 COUGH, UNSPECIFIED TYPE: ICD-10-CM

## 2025-05-29 DIAGNOSIS — J06.9 VIRAL URI: Primary | ICD-10-CM

## 2025-05-29 DIAGNOSIS — R06.2 WHEEZING: ICD-10-CM

## 2025-05-29 LAB
POC CORONAVIRUS SARS-COV-2 PCR: NEGATIVE
POC HUMAN RHINOVIRUS PCR: NEGATIVE
POC INFLUENZA A VIRUS PCR: NEGATIVE
POC INFLUENZA B VIRUS PCR: NEGATIVE
POC RESPIRATORY SYNCYTIAL VIRUS PCR: NEGATIVE

## 2025-05-29 PROCEDURE — 3008F BODY MASS INDEX DOCD: CPT | Performed by: PHYSICIAN ASSISTANT

## 2025-05-29 PROCEDURE — 99213 OFFICE O/P EST LOW 20 MIN: CPT | Performed by: PHYSICIAN ASSISTANT

## 2025-05-29 PROCEDURE — 87631 RESP VIRUS 3-5 TARGETS: CPT | Performed by: PHYSICIAN ASSISTANT

## 2025-05-29 PROCEDURE — 1036F TOBACCO NON-USER: CPT | Performed by: PHYSICIAN ASSISTANT

## 2025-05-29 PROCEDURE — 3079F DIAST BP 80-89 MM HG: CPT | Performed by: PHYSICIAN ASSISTANT

## 2025-05-29 PROCEDURE — 3075F SYST BP GE 130 - 139MM HG: CPT | Performed by: PHYSICIAN ASSISTANT

## 2025-05-29 RX ORDER — BENZONATATE 200 MG/1
200 CAPSULE ORAL 3 TIMES DAILY PRN
Qty: 21 CAPSULE | Refills: 0 | Status: SHIPPED | OUTPATIENT
Start: 2025-05-29 | End: 2025-06-05

## 2025-05-29 RX ORDER — METHYLPREDNISOLONE 4 MG/1
TABLET ORAL
Qty: 21 TABLET | Refills: 0 | Status: SHIPPED | OUTPATIENT
Start: 2025-05-29 | End: 2025-06-05

## 2025-05-29 ASSESSMENT — PATIENT HEALTH QUESTIONNAIRE - PHQ9
1. LITTLE INTEREST OR PLEASURE IN DOING THINGS: NOT AT ALL
2. FEELING DOWN, DEPRESSED OR HOPELESS: NOT AT ALL
SUM OF ALL RESPONSES TO PHQ9 QUESTIONS 1 AND 2: 0

## 2025-05-29 ASSESSMENT — ENCOUNTER SYMPTOMS: COUGH: 1

## 2025-05-29 NOTE — PROGRESS NOTES
"Subjective   Patient ID: Adalberto Franklin Jr. is a 54 y.o. male. They present today with a chief complaint of Cough (With wheezing and a rattling in chest since last night).    History of Present Illness    Cough      54-year-old patient presents to clinic with complaints of productive cough with associated wheezing, rattling in the chest, chest congestion ongoing since last night which was preceded by a sore throat for the past 2 days.  Reports has experienced shortness of breath with coughing attacks.   Reports no history of asthma or COPD.  Reports no history of pneumonia. Denies chest pain, dizziness, fevers, chills, body aches, nausea, vomiting, abdominal pain, diarrhea.       Past Medical History  Allergies as of 05/29/2025    (No Known Allergies)       Prescriptions Prior to Admission[1]     Medical History[2]    Surgical History[3]     reports that he has never smoked. He has never used smokeless tobacco. He reports current alcohol use of about 3.0 standard drinks of alcohol per week. He reports that he does not use drugs.    Review of Systems  ROS negative with the exception as noted on HPI                                Objective    Vitals:    05/29/25 1416   BP: 134/83   BP Location: Left arm   Patient Position: Sitting   Pulse: 101   Resp: 18   Temp: 36.6 °C (97.8 °F)   SpO2: 97%   Weight: 99.8 kg (220 lb)   Height: 1.803 m (5' 11\")     No LMP for male patient.    Physical Exam  Constitutional:       Appearance: Normal appearance.   HENT:      Head: Normocephalic and atraumatic.      Right Ear: Tympanic membrane, ear canal and external ear normal.      Left Ear: Tympanic membrane, ear canal and external ear normal.      Nose: Mucosal edema (and erythema) present. No rhinorrhea.      Right Sinus: No maxillary sinus tenderness or frontal sinus tenderness.      Left Sinus: No maxillary sinus tenderness or frontal sinus tenderness.      Mouth/Throat:      Lips: Pink.      Mouth: Mucous membranes are moist. " No oral lesions.      Dentition: Normal dentition. No gingival swelling.      Tongue: No lesions. Tongue does not deviate from midline.      Palate: No mass and lesions.      Pharynx: Postnasal drip present. No pharyngeal swelling, posterior oropharyngeal erythema or uvula swelling.   Cardiovascular:      Rate and Rhythm: Normal rate and regular rhythm.      Heart sounds: No murmur heard.  Pulmonary:      Effort: Pulmonary effort is normal. No respiratory distress.      Breath sounds: No stridor. Wheezing present. No rhonchi or rales.   Lymphadenopathy:      Cervical: Cervical adenopathy present.   Neurological:      Mental Status: He is alert.         Procedures    Point of Care Test & Imaging Results from this visit  Results for orders placed or performed in visit on 05/29/25   POCT SPOTFIRE R/ST Panel Mini w/COVID ([facility]) manually resulted    Specimen: Swab   Result Value Ref Range    POC Sars-Cov-2 PCR Negative Negative    POC Respiratory Syncytial Virus PCR Negative Negative    POC Influenza A Virus PCR Negative Negative    POC Influenza B Virus PCR Negative Negative    POC Human Rhinovirus PCR Negative Negative      Imaging  No results found.    Cardiology, Vascular, and Other Imaging  No other imaging results found for the past 2 days      Diagnostic study results (if any) were reviewed by aDwna Quiles PA-C.    Assessment/Plan   Allergies, medications, history, and pertinent labs/EKGs/Imaging reviewed by Danwa Quiles PA-C.   productive cough with associated wheezing, rattling in the chest, chest congestion ongoing since last night which was preceded by a sore throat for the past 2 days.   Medrol Dosepak started for wheezing.  Advised to avoid NSAIDs while taking oral steroids. Advised to drink plenty of fluids, run a cool-mist humidifier in room at night, and get plenty of rest. Pt. is advised to take 10 deep breaths and hours and continue to walk around every couple of hours. Patient  should avoid over-exertion and reduce exposure to irritants such as smoke, cold, dry air, and dust. Patient may take acetaminophen as directed to reduce fever and body aches. Risk, benefits, and potential side effects of medication(s) discussed with pt. Discussed disease/illness presentation, treatment options, progression, complications, and outcomes with patient. Pt. Has expressed understanding and is an agreement of plan of care.     Medical Decision Making      Orders and Diagnoses  Diagnoses and all orders for this visit:  Viral URI  -     methylPREDNISolone (Medrol Dospak) 4 mg tablets; Follow schedule on package instructions  -     benzonatate (Tessalon) 200 mg capsule; Take 1 capsule (200 mg) by mouth 3 times a day as needed for cough for up to 7 days. Do not crush or chew.  Cough, unspecified type  -     POCT SPOTFIRE R/ST Panel Mini w/COVID ([facility]) manually resulted  Wheezing  -     methylPREDNISolone (Medrol Dospak) 4 mg tablets; Follow schedule on package instructions      Medical Admin Record      Patient disposition: Home    Electronically signed by Dawna Quiles PA-C  2:46 PM           [1] (Not in a hospital admission)   [2]   Past Medical History:  Diagnosis Date    Hypertension    [3]   Past Surgical History:  Procedure Laterality Date    NO PAST SURGERIES      OTHER SURGICAL HISTORY Left     Left elbow w/hardware as a child in 5th grade

## 2025-05-29 NOTE — LETTER
May 29, 2025     Patient: Adalberto Franklin Jr.   YOB: 1970   Date of Visit: 5/29/2025       To Whom It May Concern:    Adalberto Franklin was seen in my clinic on 5/29/2025 at 2:15 pm. Please excuse Adalberto for his absence from work on this day to make the appointment. Please excuse pt. From work over the next 24-48 hours and may return given fever free for 24 hours.     If you have any questions or concerns, please don't hesitate to call.         Sincerely,         Dawna Quiles PA-C        CC: No Recipients

## 2025-05-29 NOTE — PATIENT INSTRUCTIONS
Warm liquids with honey  Increase fluid intake; encourage electrolytes such as Pedialyte  10 deep breaths an hour  May use tylenol as needed for fevers, chills, body aches.   Avoid nsaids while taking oral steroids.

## 2025-06-03 ENCOUNTER — OFFICE VISIT (OUTPATIENT)
Dept: URGENT CARE | Age: 55
End: 2025-06-03
Payer: COMMERCIAL

## 2025-06-03 ENCOUNTER — HOSPITAL ENCOUNTER (EMERGENCY)
Facility: HOSPITAL | Age: 55
Discharge: HOME | End: 2025-06-03
Attending: STUDENT IN AN ORGANIZED HEALTH CARE EDUCATION/TRAINING PROGRAM
Payer: COMMERCIAL

## 2025-06-03 ENCOUNTER — APPOINTMENT (OUTPATIENT)
Dept: CARDIOLOGY | Facility: HOSPITAL | Age: 55
End: 2025-06-03
Payer: COMMERCIAL

## 2025-06-03 ENCOUNTER — APPOINTMENT (OUTPATIENT)
Dept: RADIOLOGY | Facility: HOSPITAL | Age: 55
End: 2025-06-03
Payer: COMMERCIAL

## 2025-06-03 VITALS
SYSTOLIC BLOOD PRESSURE: 179 MMHG | TEMPERATURE: 97.3 F | RESPIRATION RATE: 18 BRPM | WEIGHT: 220 LBS | BODY MASS INDEX: 30.8 KG/M2 | HEART RATE: 69 BPM | OXYGEN SATURATION: 96 % | HEIGHT: 71 IN | DIASTOLIC BLOOD PRESSURE: 101 MMHG

## 2025-06-03 VITALS
WEIGHT: 225 LBS | DIASTOLIC BLOOD PRESSURE: 90 MMHG | BODY MASS INDEX: 31.38 KG/M2 | HEART RATE: 89 BPM | RESPIRATION RATE: 20 BRPM | OXYGEN SATURATION: 98 % | TEMPERATURE: 98.2 F | SYSTOLIC BLOOD PRESSURE: 163 MMHG

## 2025-06-03 DIAGNOSIS — R05.1 ACUTE COUGH: Primary | ICD-10-CM

## 2025-06-03 DIAGNOSIS — J40 BRONCHITIS: ICD-10-CM

## 2025-06-03 DIAGNOSIS — T78.2XXA ANAPHYLAXIS, INITIAL ENCOUNTER: Primary | ICD-10-CM

## 2025-06-03 DIAGNOSIS — T36.95XA ALLERGIC REACTION DUE TO ANTIBACTERIAL DRUG: ICD-10-CM

## 2025-06-03 PROCEDURE — 71046 X-RAY EXAM CHEST 2 VIEWS: CPT | Performed by: RADIOLOGY

## 2025-06-03 PROCEDURE — 93005 ELECTROCARDIOGRAM TRACING: CPT

## 2025-06-03 PROCEDURE — 71046 X-RAY EXAM CHEST 2 VIEWS: CPT

## 2025-06-03 PROCEDURE — 99284 EMERGENCY DEPT VISIT MOD MDM: CPT | Mod: 25 | Performed by: STUDENT IN AN ORGANIZED HEALTH CARE EDUCATION/TRAINING PROGRAM

## 2025-06-03 RX ORDER — METHYLPREDNISOLONE SODIUM SUCCINATE 125 MG/2ML
125 INJECTION INTRAMUSCULAR; INTRAVENOUS ONCE
Status: DISCONTINUED | OUTPATIENT
Start: 2025-06-03 | End: 2025-06-03

## 2025-06-03 RX ORDER — METHYLPREDNISOLONE SODIUM SUCCINATE 125 MG/2ML
125 INJECTION INTRAMUSCULAR; INTRAVENOUS ONCE
Status: COMPLETED | OUTPATIENT
Start: 2025-06-03 | End: 2025-06-03

## 2025-06-03 RX ORDER — IPRATROPIUM BROMIDE AND ALBUTEROL SULFATE 2.5; .5 MG/3ML; MG/3ML
3 SOLUTION RESPIRATORY (INHALATION) ONCE
Status: COMPLETED | OUTPATIENT
Start: 2025-06-03 | End: 2025-06-03

## 2025-06-03 RX ORDER — EPINEPHRINE 0.3 MG/.3ML
1 INJECTION SUBCUTANEOUS ONCE AS NEEDED
Qty: 2 EACH | Refills: 0 | Status: SHIPPED | OUTPATIENT
Start: 2025-06-03

## 2025-06-03 RX ADMIN — IPRATROPIUM BROMIDE AND ALBUTEROL SULFATE 3 ML: 2.5; .5 SOLUTION RESPIRATORY (INHALATION) at 18:57

## 2025-06-03 RX ADMIN — IPRATROPIUM BROMIDE AND ALBUTEROL SULFATE 3 ML: 2.5; .5 SOLUTION RESPIRATORY (INHALATION) at 17:32

## 2025-06-03 RX ADMIN — METHYLPREDNISOLONE SODIUM SUCCINATE 125 MG: 125 INJECTION INTRAMUSCULAR; INTRAVENOUS at 18:58

## 2025-06-03 ASSESSMENT — ENCOUNTER SYMPTOMS
SHORTNESS OF BREATH: 1
COUGH: 1

## 2025-06-03 NOTE — Clinical Note
Adalberto Franklin was seen and treated in our emergency department on 6/3/2025.  He may return to work on 06/05/2025.       If you have any questions or concerns, please don't hesitate to call.      Shaila Scales MD

## 2025-06-03 NOTE — PROGRESS NOTES
"Subjective   Patient ID: Adalberto Franklin Jr. is a 55 y.o. male. They present today with a chief complaint of Cough (With rattling in chest, was seen last week and has gotten worse).    History of Present Illness  Pt is a55 year old male who was seen a week ago with a uri he was given steroids and cough medication and has gotten worse her returned for increased shortness of breath      Cough  Associated symptoms include shortness of breath.       Past Medical History  Allergies as of 06/03/2025 - Reviewed 06/03/2025   Allergen Reaction Noted    Rocephin [ceftriaxone] Anaphylaxis 06/03/2025       Prescriptions Prior to Admission[1]     Medical History[2]    Surgical History[3]     reports that he has never smoked. He has never used smokeless tobacco. He reports current alcohol use of about 3.0 standard drinks of alcohol per week. He reports that he does not use drugs.    Review of Systems  Review of Systems   Respiratory:  Positive for cough and shortness of breath.                                   Objective    Vitals:    06/03/25 1721   BP: (!) 179/101   BP Location: Left arm   Patient Position: Sitting   Pulse: 69   Resp: 18   Temp: 36.3 °C (97.3 °F)   SpO2: 96%   Weight: 99.8 kg (220 lb)   Height: 1.803 m (5' 11\")     No LMP for male patient.    Physical Exam  Constitutional:       Appearance: Normal appearance.   HENT:      Nose: Congestion present.   Eyes:      Extraocular Movements: Extraocular movements intact.      Pupils: Pupils are equal, round, and reactive to light.   Cardiovascular:      Rate and Rhythm: Normal rate and regular rhythm.   Pulmonary:      Breath sounds: Wheezing and rales present.      Comments: Pt with wheezing and crackles lll  Neurological:      Mental Status: He is alert.         Procedures    Point of Care Test & Imaging Results from this visit  No results found for this visit on 06/03/25.   Imaging  No results found.    Cardiology, Vascular, and Other Imaging  No other imaging " results found for the past 2 days      Diagnostic study results (if any) were reviewed by Mer Carranza MD.    Assessment/Plan   Allergies, medications, history, and pertinent labs/EKGs/Imaging reviewed by Mer Carranza MD.     Medical Decision Making  Pt given duoneb and had some improved but having sig crackles in lll  Discussed with patient that will trat for pneuomin  Pt given 1g I'm ceftriaxone  He developed hives   He was given solumedrol 125 Im  Duoneb started. He fainted and ems was called  Pt recovered quickle  Ems arrived promptly  He was transported to [facility] in stable condition      Orders and Diagnoses  Diagnoses and all orders for this visit:  Acute cough  -     ipratropium-albuteroL (Duo-Neb) 0.5-2.5 mg/3 mL nebulizer solution 3 mL  -     cefTRIAXone (Rocephin) 1,000 mg in lidocaine (Xylocaine) 4 mL injection  Allergic reaction due to antibacterial drug      Medical Admin Record  Administrations This Visit       ipratropium-albuteroL (Duo-Neb) 0.5-2.5 mg/3 mL nebulizer solution 3 mL       Admin Date  06/03/2025 Action  Given Dose  3 mL Route  nebulization Documented By  Bianca Horton MA                    Patient disposition: ED    Electronically signed by Mer Carranza MD  6:53 PM           [1] (Not in a hospital admission)   [2]   Past Medical History:  Diagnosis Date    Hypertension    [3]   Past Surgical History:  Procedure Laterality Date    NO PAST SURGERIES      OTHER SURGICAL HISTORY Left     Left elbow w/hardware as a child in 5th grade

## 2025-06-04 NOTE — DISCHARGE INSTRUCTIONS
You were seen today for an allergic reaction after receiving an antibiotic.  This was likely a severe allergic reaction called anaphylaxis.  I prescribed EpiPen's in case this happens to you again in the future.  It is documented in your chart that you are allergic to that antibiotic.    Regarding your cough, this is likely bronchitis.  No pneumonia seen on chest x-ray.  Use over-the-counter cough medications as needed.    Follow-up with your primary care physician within the next 1 week regarding all of this to ensure that you are improving.    Return to the emergency department with difficulty breathing, chest pain, passing out, tongue or throat swelling, intractable vomiting, or any other concerning symptoms.

## 2025-06-04 NOTE — ED PROVIDER NOTES
History of Present Illness     History provided by: Patient and Family Member  Limitations to History: None  External Records Reviewed with Brief Summary: urgent care visit today, treated empirically for PNA, patient then had hives and syncope, gives duoneb and steroids.    HPI:  Adalberto Franklin Jr. is a 55 y.o. male with PMHx HTN who is presenting for allergic reaction.  He reports that he has had cough and congestion for over a week now.  He went to urgent care today who were clinically concerned for pneumonia and gave him IM ceftriaxone.  Patient then had diffuse hives and passed out.  His significant other at bedside states that she saw him break out in hives, he became drowsy and confused, and then he was taken back by the medical team.  He has no symptoms currently.  He denies shortness of breath, nausea, vomiting, lip/tongue/throat swelling, itchiness, lightheadedness.  He had no known allergies prior to this.  Regarding the symptoms that brought him to urgent care, he denies objective fevers, shortness of breath, chest pain, nausea/vomiting.    Physical Exam   Triage vitals:  T 36.8 °C (98.2 °F)  HR 89  /90  RR 20  O2 98 % None (Room air)    GEN: Well-appearing male, in no distress.   HEENT: Normocephalic and atraumatic. Moist mucous membranes. No lip or tongue swelling, uvula midline. No drooling, hoarseness, stridor.  NECK: Normal ROM.   CARDIAC: Regular rate and rhythm.  RESP: No increased work of breathing. Transmitted upper airway sounds diffusely. No wheezing.   ABD: Soft, nondistended, nontender.  EXTREMITIES: Normal ROM.   SKIN: Warm, dry. Scattered uritcaria on bilateral arms and face.  NEURO: Alert and oriented x4. Moving all extremities symmetrically and spontaneously.   PSYCH: Calm, cooperative.    Medical Decision Making & ED Course   Medical Decision Makin y.o. male with PMHx HTN who is presenting for allergic reaction.  On arrival, vitals unremarkable.  Exam shows a  well-appearing male in no respiratory distress without any lip or oral pharyngeal edema, clear lungs, and soft nontender abdomen.  He has scattered urticaria on his arms and face.    The description of the event sounds like he had an anaphylactic reaction to ceftriaxone.  He did not receive epinephrine, received Solu-Medrol.  He has no symptoms right now concerning for biphasic anaphylaxis reaction, will defer administering epi.  He denies pruritus and does not need Benadryl.  While his syncope was likely from anaphylaxis, will obtain EKG to ensure no gross cardiogenic causes.  Will obtain chest x-ray to evaluate for pneumonia as does not have this at urgent care.  Based on his history and diffuse transmitted upper airway sounds, I suspect this is more likely bronchitis.  Anticipate discharge home.  Will prescribe EpiPens.  ----     Social Determinants of Health which Significantly Impact Care: None identified     EKG Independent Interpretation: EKG interpreted by myself. Please see ED Course for full interpretation.    Independent Result Review and Interpretation: Relevant laboratory and radiographic results were reviewed and independently interpreted by myself.  As necessary, they are commented on in the ED Course.    The patient was discussed with the following consultants/services: None    ED Course:  ED Course as of 06/03/25 2330   Tue Jun 03, 202503, 2025 2036 XR chest 2 views  IMPRESSION:  No acute cardiopulmonary process.   [SS]   2042 ECG 12 lead  On my independent interpretation, normal sinus rhythm with heart rate 90, normal axis and intervals, no ST or T changes concerning for acute ischemia.  No evidence of significantly prolonged QTc, WPW, Brugada, AV block, ARVC, HOCM. [SS]   2100 Still well-appearing on reevaluation, discussed reassuring workup.  Discussed diagnosis of likely anaphylaxis, and no pneumonia on chest x-ray and supportive care at home for viral URI/bronchitis.  Patient understands and agrees  with the plan for discharge home with PCP follow-up.  Given strict return precautions. [SS]      ED Course User Index  [SS] Shaila Scales MD         Diagnoses as of 06/03/25 2330   Anaphylaxis, initial encounter   Bronchitis     Disposition   Discharge    Procedures   Procedures    Shaila Scales MD  Emergency Medicine       Shaila Scales MD  06/03/25 0433

## 2025-06-05 ENCOUNTER — OFFICE VISIT (OUTPATIENT)
Dept: PRIMARY CARE | Facility: CLINIC | Age: 55
End: 2025-06-05
Payer: COMMERCIAL

## 2025-06-05 VITALS — WEIGHT: 216 LBS | BODY MASS INDEX: 30.13 KG/M2 | SYSTOLIC BLOOD PRESSURE: 106 MMHG | DIASTOLIC BLOOD PRESSURE: 69 MMHG

## 2025-06-05 DIAGNOSIS — J18.9 PNEUMONIA DUE TO INFECTIOUS ORGANISM, UNSPECIFIED LATERALITY, UNSPECIFIED PART OF LUNG: Primary | ICD-10-CM

## 2025-06-05 PROCEDURE — 3074F SYST BP LT 130 MM HG: CPT | Performed by: STUDENT IN AN ORGANIZED HEALTH CARE EDUCATION/TRAINING PROGRAM

## 2025-06-05 PROCEDURE — 1036F TOBACCO NON-USER: CPT | Performed by: STUDENT IN AN ORGANIZED HEALTH CARE EDUCATION/TRAINING PROGRAM

## 2025-06-05 PROCEDURE — 99214 OFFICE O/P EST MOD 30 MIN: CPT | Performed by: STUDENT IN AN ORGANIZED HEALTH CARE EDUCATION/TRAINING PROGRAM

## 2025-06-05 PROCEDURE — 3078F DIAST BP <80 MM HG: CPT | Performed by: STUDENT IN AN ORGANIZED HEALTH CARE EDUCATION/TRAINING PROGRAM

## 2025-06-05 RX ORDER — LEVOFLOXACIN 750 MG/1
750 TABLET, FILM COATED ORAL EVERY 24 HOURS
Qty: 7 TABLET | Refills: 0 | Status: SHIPPED | OUTPATIENT
Start: 2025-06-05 | End: 2025-06-12

## 2025-06-05 RX ORDER — ALBUTEROL SULFATE 90 UG/1
2 INHALANT RESPIRATORY (INHALATION) EVERY 4 HOURS PRN
Qty: 18 G | Refills: 2 | Status: SHIPPED | OUTPATIENT
Start: 2025-06-05 | End: 2026-06-05

## 2025-06-05 RX ORDER — PREDNISONE 10 MG/1
TABLET ORAL
Qty: 30 TABLET | Refills: 0 | Status: SHIPPED | OUTPATIENT
Start: 2025-06-05 | End: 2025-06-17

## 2025-06-05 ASSESSMENT — ENCOUNTER SYMPTOMS
SHORTNESS OF BREATH: 1
CONSTITUTIONAL NEGATIVE: 1
NEUROLOGICAL NEGATIVE: 1
GASTROINTESTINAL NEGATIVE: 1
CARDIOVASCULAR NEGATIVE: 1
PSYCHIATRIC NEGATIVE: 1
MUSCULOSKELETAL NEGATIVE: 1
WHEEZING: 1
COUGH: 1

## 2025-06-05 NOTE — LETTER
June 5, 2025     Patient: Adalberto Franklin Jr.   YOB: 1970   Date of Visit: 6/5/2025       To Whom It May Concern:    Adalberto Franklin was seen in my clinic on 6/5/2025 at 11:00 am. Please excuse Adalberto for his absence from work on this day to make the appointment.    6/5-6/8/25 due tomedical illness. Patient should be clear to go back to work on 6/9/25 with stipulation that patient is symptom free for 24 hrs.    If you have any questions or concerns, please don't hesitate to call.         Sincerely,         Tom Rincon,         CC: No Recipients

## 2025-06-05 NOTE — PROGRESS NOTES
[facility]  Hand and Upper Extremity Service  Follow up visit         Follow up for: Left hand     Interval History: He returns for his left hand. He was seen in 2024 and diagnosed with bilateral pantrapezial arthritis. He subsequently underwent surgery with Dr. Rolle in October of 2024 and this surgery included resection of the trapezium and suspension of the thumb with two sets of fiber tech anchors from the thumb metacarpal to the index finger metacarpal. After surgery, he was out of work for about 5 months and then returned to work as a  in about April of this year. He reports a lot of pain and swelling and an abnormal appearance at the base of the thumb and weakness. He is concerned that something has gone wrong and is here for a second opinion regarding his thumb surgery.               Past medical history, medications, allergies, surgical history and review of systems are reviewed and otherwise unchanged when compared to last visit on 1/30/24         Examination:  Constitutional: Oriented to person, place, and time.  Appears well-developed and well-nourished.  Head: Normocephalic and atraumatic.  Eyes: Pupils are equal, round, and reactive to light.  Cardiovascular: Intact distal pulses.  Pulmonary/Chest/Breast: Effort normal. No respiratory distress.  Neurological: Alert and oriented to person, place, and time.  Skin: Skin is warm and dry.  Psychiatric: normal mood and affect.  Behavior is normal.  Musculoskeletal: Left hand reveals well healed surgical incision at the base of the thumb metacarpal. Fairly significant amount of swelling and edema at the thumb metacarpal base. Longitudinal instability of the thumb metacarpal and pain with manipulation of the CMC joint. Tenderness to palpation dorsally over the scaphoid trapezoid join. No pain over radial styloid and normal sensation in the radial sensory nerve distribution. About a 5 degree MP joint hyperextension  instability. Cross-palm flexion of the thumb to the distal palmar crease at the base of the small finger.       Personal Interpretation of Diagnostic studies: X-rays of the left had taken today demonstrate postsurgical changes. Complete resection of the trapezium with proximal migration of the thumb such that it is abutting up against the distal aspect of the scaphoid and has persistent arthritic changes between the scaphoid and trapezoid.        Impression: Pain and instability following left thumb CMC arthroplasty       Plan: Given his age and occupation, I think he is going to have problems with this moving forward. We have discussed potential revision surgery. In order to revise the surgery, it would be a more robust construct and based on Dr. Rolle's operative report, the FCR tendon had a normal appearance and on exam today the tendon is palpable so that would likely be a structure that we would utilize for reconstruction. Secondary restraints may be necessary as well such as a mini tightrope device. Surgery would also include resection of the proximal aspect of the trapezoid to address the scaphoid and trapezoid arthritic changes. Certainly I can give him no guarantees for complete resolution. This is a revision case and there is a little but more uncertainty with that but we would expect that he would require many months out of work given his occupation. He is going to take this under consideration and speak with his wife and will contact my office if he has interest in proceeding with revision thumb CMC arthroplasty with partial trapezoid resection in the future.     Follow up: For surgery when scheduled    We discussed risks, benefits, alternatives and anticipated post op course including time away from work and activities following surgical treatment for the patient's condition. This is major surgery with identifiable risks. No guarantees for success have been provided. The patient has expressed  understanding and has elected to pursue operative treatment.        For Surgical Planning:  Diagnosis: Left thumb CMC arthritis  Procedure: Revision left thumb CMC arthroplasty, partial trapezoid resection  CPT: 24154, 58271  Anesthesia: General With preoperative regional block  Duration: 75 minutes  Special Equipment Needed: Arthrex mini tight rope, Arthrex CMC suspension plasty kit  Medical Notes / PM / DM / PAT needed?:  PAT requested  Location: Any  Initial Post Operative Visit: 2 weeks                 Mingo Hanna MD  [facility]  Department of Orthopaedic Surgery  Hand and Upper Extremity Reconstruction    Scribe Attestation  By signing my name below, IDanisha , José Luis   attest that this documentation has been prepared under the direction and in the presence of Dr. Mingo Hanna.    Dictation performed with the use of voice recognition software.  Syntax and grammatical errors may exist.

## 2025-06-09 ENCOUNTER — TELEPHONE (OUTPATIENT)
Dept: PRIMARY CARE | Facility: CLINIC | Age: 55
End: 2025-06-09

## 2025-06-09 ENCOUNTER — APPOINTMENT (OUTPATIENT)
Dept: ORTHOPEDIC SURGERY | Facility: CLINIC | Age: 55
End: 2025-06-09
Payer: COMMERCIAL

## 2025-06-09 ENCOUNTER — HOSPITAL ENCOUNTER (OUTPATIENT)
Dept: RADIOLOGY | Facility: CLINIC | Age: 55
Discharge: HOME | End: 2025-06-09

## 2025-06-09 VITALS — BODY MASS INDEX: 30.24 KG/M2 | WEIGHT: 216 LBS | HEIGHT: 71 IN

## 2025-06-09 DIAGNOSIS — M19.049 CMC ARTHRITIS: Primary | ICD-10-CM

## 2025-06-09 DIAGNOSIS — M19.049 CMC ARTHRITIS: ICD-10-CM

## 2025-06-09 PROCEDURE — 99214 OFFICE O/P EST MOD 30 MIN: CPT | Performed by: ORTHOPAEDIC SURGERY

## 2025-06-09 PROCEDURE — 3008F BODY MASS INDEX DOCD: CPT | Performed by: ORTHOPAEDIC SURGERY

## 2025-06-09 PROCEDURE — 73130 X-RAY EXAM OF HAND: CPT | Mod: LT

## 2025-06-09 PROCEDURE — 1036F TOBACCO NON-USER: CPT | Performed by: ORTHOPAEDIC SURGERY

## 2025-06-09 PROCEDURE — 73130 X-RAY EXAM OF HAND: CPT | Mod: LEFT SIDE | Performed by: RADIOLOGY

## 2025-06-09 ASSESSMENT — PAIN - FUNCTIONAL ASSESSMENT: PAIN_FUNCTIONAL_ASSESSMENT: NO/DENIES PAIN

## 2025-06-09 NOTE — TELEPHONE ENCOUNTER
Hi.  Patient wanted me to let you know the medication you prescribed to him last week is helping him to feel better.  Just an FYI.  Thanks!

## 2025-06-09 NOTE — LETTER
June 9, 2025     Tom Rincon DO  [address]  [address]  [facility] [address]    Patient: Adalberto Franklin Jr.   YOB: 1970   Date of Visit: 6/9/2025       Dear Dr. Tom Rincon DO:    Thank you for referring Adalberto Franklin to me for evaluation. Below are my notes for this consultation.  If you have questions, please do not hesitate to call me. I look forward to following your patient along with you.       Sincerely,     Mingo Hanna MD      CC: No Recipients  ______________________________________________________________________________________    [facility]  Hand and Upper Extremity Service  Follow up visit         Follow up for: Left hand     Interval History: He returns for his left hand. He was seen in 2024 and diagnosed with bilateral pantrapezial arthritis. He subsequently underwent surgery with Dr. Rolle in October of 2024 and this surgery included resection of the trapezium and suspension of the thumb with two sets of fiber tech anchors from the thumb metacarpal to the index finger metacarpal. After surgery, he was out of work for about 5 months and then returned to work as a  in about April of this year. He reports a lot of pain and swelling and an abnormal appearance at the base of the thumb and weakness. He is concerned that something has gone wrong and is here for a second opinion regarding his thumb surgery.               Past medical history, medications, allergies, surgical history and review of systems are reviewed and otherwise unchanged when compared to last visit on 1/30/24         Examination:  Constitutional: Oriented to person, place, and time.  Appears well-developed and well-nourished.  Head: Normocephalic and atraumatic.  Eyes: Pupils are equal, round, and reactive to light.  Cardiovascular: Intact distal pulses.  Pulmonary/Chest/Breast: Effort normal. No respiratory distress.  Neurological: Alert and  oriented to person, place, and time.  Skin: Skin is warm and dry.  Psychiatric: normal mood and affect.  Behavior is normal.  Musculoskeletal: Left hand reveals well healed surgical incision at the base of the thumb metacarpal. Fairly significant amount of swelling and edema at the thumb metacarpal base. Longitudinal instability of the thumb metacarpal and pain with manipulation of the CMC joint. Tenderness to palpation dorsally over the scaphoid trapezoid join. No pain over radial styloid and normal sensation in the radial sensory nerve distribution. About a 5 degree MP joint hyperextension instability. Cross-palm flexion of the thumb to the distal palmar crease at the base of the small finger.       Personal Interpretation of Diagnostic studies: X-rays of the left had taken today demonstrate postsurgical changes. Complete resection of the trapezium with proximal migration of the thumb such that it is abutting up against the distal aspect of the scaphoid and has persistent arthritic changes between the scaphoid and trapezoid.        Impression: Pain and instability following left thumb CMC arthroplasty       Plan: Given his age and occupation, I think he is going to have problems with this moving forward. We have discussed potential revision surgery. In order to revise the surgery, it would be a more robust construct and based on Dr. Rolle's operative report, the FCR tendon had a normal appearance and on exam today the tendon is palpable so that would likely be a structure that we would utilize for reconstruction. Secondary restraints may be necessary as well such as a mini tightrope device. Surgery would also include resection of the proximal aspect of the trapezoid to address the scaphoid and trapezoid arthritic changes. Certainly I can give him no guarantees for complete resolution. This is a revision case and there is a little but more uncertainty with that but we would expect that he would require many  months out of work given his occupation. He is going to take this under consideration and speak with his wife and will contact my office if he has interest in proceeding with revision thumb CMC arthroplasty with partial trapezoid resection in the future.     Follow up: For surgery when scheduled    We discussed risks, benefits, alternatives and anticipated post op course including time away from work and activities following surgical treatment for the patient's condition. This is major surgery with identifiable risks. No guarantees for success have been provided. The patient has expressed understanding and has elected to pursue operative treatment.        For Surgical Planning:  Diagnosis: Left thumb CMC arthritis  Procedure: Revision left thumb CMC arthroplasty, partial trapezoid resection  CPT: 91293, 74187  Anesthesia: General With preoperative regional block  Duration: 75 minutes  Special Equipment Needed: Arthrex mini tight rope, Arthrex CMC suspension plasty kit  Medical Notes / PM / DM / PAT needed?:  PAT requested  Location: Any  Initial Post Operative Visit: 2 weeks                 Mingo Hanna MD  [facility]  Department of Orthopaedic Surgery  Hand and Upper Extremity Reconstruction    Scribe Attestation  By signing my name below, IDanisha Scribe   attest that this documentation has been prepared under the direction and in the presence of Dr. Mingo Hanna.    Dictation performed with the use of voice recognition software.  Syntax and grammatical errors may exist.

## 2025-07-09 DIAGNOSIS — I10 PRIMARY HYPERTENSION: ICD-10-CM

## 2025-07-09 RX ORDER — LISINOPRIL 20 MG/1
20 TABLET ORAL DAILY
Qty: 90 TABLET | Refills: 0 | Status: SHIPPED | OUTPATIENT
Start: 2025-07-09 | End: 2025-10-07

## 2025-07-09 RX ORDER — AMLODIPINE BESYLATE 5 MG/1
5 TABLET ORAL DAILY
Qty: 90 TABLET | Refills: 0 | Status: SHIPPED | OUTPATIENT
Start: 2025-07-09

## (undated) DEVICE — CUFF, TOURNIQUET, 18 X 4, DUAL PORT/SNGL BLADDER, DISP, LF

## (undated) DEVICE — SUTURE, FIBERWIRE, 4-0

## (undated) DEVICE — Device

## (undated) DEVICE — DX SWIVELOCK SL, W/ FORKED EYELET, 3.5 X 8.5MM

## (undated) DEVICE — STRIP, SKIN CLOSURE, STERI STRIP, REINFORCED, 0.5 X 4 IN

## (undated) DEVICE — BANDAGE, ELASTIC, PREMIUM, SELF-CLOSE, 4 IN X 5.5 YD, STERILE

## (undated) DEVICE — DRESSING, GAUZE, PETROLATUM, PATCH, XEROFORM, 1 X 8 IN, STERILE

## (undated) DEVICE — COVER, EQUIPMENT, C ARM, BAG, BANDED, 36 X 28 IN, STERILE

## (undated) DEVICE — SLEEVE, VASO PRESS, CALF GARMENT, MEDIUM, GREEN

## (undated) DEVICE — PADDING, CAST, COTTON, 3 IN X 4 YD

## (undated) DEVICE — TOOL, TRAPEZIECTOMY, HAND & WRIST, STERILE

## (undated) DEVICE — FORCEP, BIOPOLAR, ADSON, NON INSUL, 5IN 1.0MM